# Patient Record
Sex: FEMALE | Race: WHITE | Employment: FULL TIME | URBAN - METROPOLITAN AREA
[De-identification: names, ages, dates, MRNs, and addresses within clinical notes are randomized per-mention and may not be internally consistent; named-entity substitution may affect disease eponyms.]

---

## 2018-11-14 ENCOUNTER — OFFICE VISIT (OUTPATIENT)
Dept: OBGYN | Age: 38
End: 2018-11-14
Payer: COMMERCIAL

## 2018-11-14 ENCOUNTER — HOSPITAL ENCOUNTER (OUTPATIENT)
Age: 38
Setting detail: SPECIMEN
Discharge: HOME | End: 2018-11-14
Payer: COMMERCIAL

## 2018-11-14 VITALS
DIASTOLIC BLOOD PRESSURE: 80 MMHG | SYSTOLIC BLOOD PRESSURE: 136 MMHG | HEIGHT: 65 IN | WEIGHT: 204 LBS | BODY MASS INDEX: 33.99 KG/M2

## 2018-11-14 DIAGNOSIS — N93.9 ABNORMAL UTERINE BLEEDING: ICD-10-CM

## 2018-11-14 DIAGNOSIS — Z01.419 WELL WOMAN EXAM WITH ROUTINE GYNECOLOGICAL EXAM: Primary | ICD-10-CM

## 2018-11-14 DIAGNOSIS — Z11.3 SCREEN FOR STD (SEXUALLY TRANSMITTED DISEASE): ICD-10-CM

## 2018-11-14 DIAGNOSIS — Z12.39 SCREENING FOR BREAST CANCER: ICD-10-CM

## 2018-11-14 DIAGNOSIS — Z01.419 WELL WOMAN EXAM WITH ROUTINE GYNECOLOGICAL EXAM: ICD-10-CM

## 2018-11-14 PROCEDURE — 58100 BIOPSY OF UTERUS LINING: CPT | Performed by: OBSTETRICS & GYNECOLOGY

## 2018-11-14 PROCEDURE — 87591 N.GONORRHOEAE DNA AMP PROB: CPT

## 2018-11-14 PROCEDURE — 88305 TISSUE EXAM BY PATHOLOGIST: CPT

## 2018-11-14 PROCEDURE — G0145 SCR C/V CYTO,THINLAYER,RESCR: HCPCS

## 2018-11-14 PROCEDURE — 87491 CHLMYD TRACH DNA AMP PROBE: CPT

## 2018-11-14 PROCEDURE — 99396 PREV VISIT EST AGE 40-64: CPT | Performed by: OBSTETRICS & GYNECOLOGY

## 2018-11-14 ASSESSMENT — PATIENT HEALTH QUESTIONNAIRE - PHQ9
SUM OF ALL RESPONSES TO PHQ9 QUESTIONS 1 & 2: 0
1. LITTLE INTEREST OR PLEASURE IN DOING THINGS: 0
SUM OF ALL RESPONSES TO PHQ QUESTIONS 1-9: 0
2. FEELING DOWN, DEPRESSED OR HOPELESS: 0
SUM OF ALL RESPONSES TO PHQ QUESTIONS 1-9: 0

## 2018-11-14 NOTE — PROGRESS NOTES
hernia          PE:  Vital Signs  Blood pressure 136/80, height 5' 5\" (1.651 m), weight 204 lb (92.5 kg), last menstrual period 10/26/2018. Labs:    No results found for this visit on 11/14/18. NURSE: COOPER    HPI: The patient is here today with complaints of very heavy menstrual bleeding. She describes last month passing a large amount of tissue in addition to heavy blood clots associated with severe dysmenorrhea. She states she has not been able to get pregnant since her delivery in 2007 is using nothing for birth control. She also describes a sharp or pinching pain in the lower pelvis times    The patient describes her delivery as being very difficult and having a posterior tear as well as developing severe incontinence required a bladder neck suspension    Review of systems: No PT denies fever, chills, nausea and vomiting         Objective  Lymphatic:   no lymphadenopathy  Heent:   negative   Cor: regular rate and rhythm, no murmurs              Pul:clear to auscultation bilaterally- no wheezes, rales or rhonchi, normal air movement, no respiratory distress      GI: Abdomen soft, non-tender. BS normal. No masses,  No organomegaly           Extremities: normal strength, tone, and muscle mass   Breasts: Breast:normal appearance, no masses or tenderness, Inspection negative, No nipple retraction or dimpling, No nipple discharge or bleeding, No axillary or supraclavicular adenopathy, Normal to palpation without dominant masses   Pelvic Exam: GENITAL/URINARY:  External Genitalia:  General appearance; normal, Hair distribution; normal, Lesions absent  Cervix:  General appearance normal, Lesions absent, Discharge absent, Tenderness absent, Enlargement absent, Nodularity absent, there does appear to be some scarring of the cervix  Uterus:  Size normal, Contour normal, Position normal, Masses absent, Consistency; normal, Support normal, Tenderness absent  Adenexa:   Masses absent, Tenderness absent, Enlargement

## 2018-11-16 LAB
CHLAMYDIA BY THIN PREP: NEGATIVE
CYTOLOGY REPORT: NORMAL
N. GONORRHOEAE DNA, THIN PREP: NEGATIVE
SURGICAL PATHOLOGY REPORT: NORMAL

## 2018-11-29 ENCOUNTER — OFFICE VISIT (OUTPATIENT)
Dept: OBGYN | Age: 38
End: 2018-11-29
Payer: COMMERCIAL

## 2018-11-29 VITALS
WEIGHT: 203 LBS | BODY MASS INDEX: 33.82 KG/M2 | HEIGHT: 65 IN | SYSTOLIC BLOOD PRESSURE: 124 MMHG | DIASTOLIC BLOOD PRESSURE: 74 MMHG

## 2018-11-29 DIAGNOSIS — N93.9 ABNORMAL UTERINE BLEEDING: Primary | ICD-10-CM

## 2018-11-29 PROCEDURE — 76856 US EXAM PELVIC COMPLETE: CPT | Performed by: OBSTETRICS & GYNECOLOGY

## 2018-11-29 PROCEDURE — 99213 OFFICE O/P EST LOW 20 MIN: CPT | Performed by: OBSTETRICS & GYNECOLOGY

## 2018-11-29 RX ORDER — ETHYNODIOL DIACETATE AND ETHINYL ESTRADIOL 1 MG-35MCG
1 KIT ORAL DAILY
Qty: 1 PACKET | Refills: 12 | Status: SHIPPED | OUTPATIENT
Start: 2018-11-29 | End: 2019-11-25 | Stop reason: SDUPTHER

## 2018-11-29 NOTE — LETTER
Dillan Manzo  [address]  Phone: [phone]  Fax: [phone]    Trice Vizcarra MD        November 29, 2018     Patient: Chapincito Last   YOB: 1980   Date of Visit: 11/29/2018       To Whom it May Concern:    Ace Anglin was seen in my clinic on 11/29/2018. She may return to work on 11/30/2018. If you have any questions or concerns, please don't hesitate to call.     Sincerely,         Trice Vizcarra MD

## 2018-11-29 NOTE — PROGRESS NOTES
complete transvaginal non OB    ethynodiol-ethinyl estradiol (ZOVIA 1/35E, 28,) 1-35 MG-MCG per tablet             No Follow-up on file. FF: 15 minutes    There are no Patient Instructions on file for this visit. Over 75%of time spent on counseling and care coordination on: see assessment and plan,  She was also counseled on her preventative health maintenance recommendations and follow-up.       Robbi Rubio,11/29/2018 5:23 PM

## 2019-11-25 ENCOUNTER — OFFICE VISIT (OUTPATIENT)
Dept: OBGYN | Age: 39
End: 2019-11-25
Payer: COMMERCIAL

## 2019-11-25 ENCOUNTER — HOSPITAL ENCOUNTER (OUTPATIENT)
Age: 39
Setting detail: SPECIMEN
Discharge: HOME | End: 2019-11-25
Payer: COMMERCIAL

## 2019-11-25 VITALS
DIASTOLIC BLOOD PRESSURE: 68 MMHG | WEIGHT: 178 LBS | SYSTOLIC BLOOD PRESSURE: 116 MMHG | BODY MASS INDEX: 30.39 KG/M2 | HEIGHT: 64 IN

## 2019-11-25 DIAGNOSIS — Z12.39 SCREENING FOR BREAST CANCER: ICD-10-CM

## 2019-11-25 DIAGNOSIS — Z01.419 WOMEN'S ANNUAL ROUTINE GYNECOLOGICAL EXAMINATION: ICD-10-CM

## 2019-11-25 DIAGNOSIS — N93.9 ABNORMAL UTERINE BLEEDING: ICD-10-CM

## 2019-11-25 DIAGNOSIS — Z01.419 WOMEN'S ANNUAL ROUTINE GYNECOLOGICAL EXAMINATION: Primary | ICD-10-CM

## 2019-11-25 PROCEDURE — 99396 PREV VISIT EST AGE 40-64: CPT | Performed by: OBSTETRICS & GYNECOLOGY

## 2019-11-25 PROCEDURE — G0145 SCR C/V CYTO,THINLAYER,RESCR: HCPCS

## 2019-11-25 RX ORDER — ETHYNODIOL DIACETATE AND ETHINYL ESTRADIOL 1 MG-35MCG
1 KIT ORAL DAILY
Qty: 1 PACKET | Refills: 12 | Status: SHIPPED | OUTPATIENT
Start: 2019-11-25 | End: 2020-01-15

## 2019-11-27 ENCOUNTER — HOSPITAL ENCOUNTER (OUTPATIENT)
Dept: WOMENS IMAGING | Age: 39
Discharge: HOME | End: 2019-11-29
Payer: COMMERCIAL

## 2019-11-27 DIAGNOSIS — Z12.39 SCREENING FOR BREAST CANCER: ICD-10-CM

## 2019-11-27 PROCEDURE — 77063 BREAST TOMOSYNTHESIS BI: CPT

## 2019-11-29 DIAGNOSIS — R92.8 ABNORMALITY OF RIGHT BREAST ON SCREENING MAMMOGRAM: Primary | ICD-10-CM

## 2019-12-03 LAB — CYTOLOGY REPORT: NORMAL

## 2019-12-18 ENCOUNTER — HOSPITAL ENCOUNTER (OUTPATIENT)
Dept: ULTRASOUND IMAGING | Age: 39
Discharge: HOME | End: 2019-12-20
Payer: COMMERCIAL

## 2019-12-18 ENCOUNTER — HOSPITAL ENCOUNTER (OUTPATIENT)
Dept: WOMENS IMAGING | Age: 39
Discharge: HOME | End: 2019-12-20
Payer: COMMERCIAL

## 2019-12-18 DIAGNOSIS — R92.8 ABNORMAL MAMMOGRAM OF RIGHT BREAST: Primary | ICD-10-CM

## 2019-12-18 DIAGNOSIS — R92.8 ABNORMALITY OF RIGHT BREAST ON SCREENING MAMMOGRAM: ICD-10-CM

## 2019-12-18 PROCEDURE — 76641 ULTRASOUND BREAST COMPLETE: CPT

## 2019-12-18 PROCEDURE — G0279 TOMOSYNTHESIS, MAMMO: HCPCS

## 2019-12-20 ENCOUNTER — HOSPITAL ENCOUNTER (OUTPATIENT)
Dept: ULTRASOUND IMAGING | Age: 39
Discharge: HOME | End: 2019-12-22
Payer: COMMERCIAL

## 2019-12-20 ENCOUNTER — HOSPITAL ENCOUNTER (OUTPATIENT)
Dept: WOMENS IMAGING | Age: 39
Discharge: HOME | End: 2019-12-22
Payer: COMMERCIAL

## 2019-12-20 VITALS
DIASTOLIC BLOOD PRESSURE: 91 MMHG | HEART RATE: 79 BPM | OXYGEN SATURATION: 100 % | SYSTOLIC BLOOD PRESSURE: 120 MMHG | RESPIRATION RATE: 12 BRPM

## 2019-12-20 DIAGNOSIS — R92.8 ABNORMAL MAMMOGRAM OF RIGHT BREAST: ICD-10-CM

## 2019-12-20 DIAGNOSIS — N63.10 BREAST MASS, RIGHT: ICD-10-CM

## 2019-12-20 PROCEDURE — 2709999900 US BREAST BIOPSY W LOC DEVICE 1ST LESION RIGHT

## 2019-12-20 PROCEDURE — 88360 TUMOR IMMUNOHISTOCHEM/MANUAL: CPT

## 2019-12-20 PROCEDURE — 2500000003 HC RX 250 WO HCPCS: Performed by: RADIOLOGY

## 2019-12-20 PROCEDURE — 77065 DX MAMMO INCL CAD UNI: CPT

## 2019-12-20 PROCEDURE — 88305 TISSUE EXAM BY PATHOLOGIST: CPT

## 2019-12-20 PROCEDURE — 88342 IMHCHEM/IMCYTCHM 1ST ANTB: CPT

## 2019-12-20 RX ORDER — LIDOCAINE HYDROCHLORIDE 10 MG/ML
INJECTION, SOLUTION EPIDURAL; INFILTRATION; INTRACAUDAL; PERINEURAL
Status: COMPLETED | OUTPATIENT
Start: 2019-12-20 | End: 2019-12-20

## 2019-12-20 RX ADMIN — LIDOCAINE HYDROCHLORIDE 20 ML: 10 INJECTION, SOLUTION EPIDURAL; INFILTRATION; INTRACAUDAL; PERINEURAL at 13:28

## 2019-12-24 LAB — SURGICAL PATHOLOGY REPORT: NORMAL

## 2020-01-03 ENCOUNTER — OFFICE VISIT (OUTPATIENT)
Dept: OBGYN | Age: 40
End: 2020-01-03
Payer: COMMERCIAL

## 2020-01-03 VITALS
WEIGHT: 175 LBS | SYSTOLIC BLOOD PRESSURE: 122 MMHG | DIASTOLIC BLOOD PRESSURE: 78 MMHG | HEIGHT: 64 IN | BODY MASS INDEX: 29.88 KG/M2

## 2020-01-03 PROCEDURE — 99213 OFFICE O/P EST LOW 20 MIN: CPT | Performed by: OBSTETRICS & GYNECOLOGY

## 2020-01-03 ASSESSMENT — PATIENT HEALTH QUESTIONNAIRE - PHQ9: DEPRESSION UNABLE TO ASSESS: PT REFUSES

## 2020-01-06 ENCOUNTER — OFFICE VISIT (OUTPATIENT)
Dept: SURGERY | Age: 40
End: 2020-01-06
Payer: COMMERCIAL

## 2020-01-06 VITALS — WEIGHT: 179 LBS | RESPIRATION RATE: 18 BRPM | HEIGHT: 64 IN | BODY MASS INDEX: 30.56 KG/M2

## 2020-01-06 PROCEDURE — 99202 OFFICE O/P NEW SF 15 MIN: CPT | Performed by: SURGERY

## 2020-01-13 ENCOUNTER — INITIAL CONSULT (OUTPATIENT)
Dept: ONCOLOGY | Age: 40
End: 2020-01-13
Payer: COMMERCIAL

## 2020-01-13 PROCEDURE — 96040 PR GENETIC COUNSELING, EACH 30 MIN: CPT | Performed by: GENETIC COUNSELOR, MS

## 2020-01-13 NOTE — PROGRESS NOTES
3 [facility]   Hereditary Cancer Risk Assessment     Name: Zhao Malagon   YOB: 1980   Date of Consultation: 1/13/20     Ms. Ulises Villalobos was seen at the [facility] for genetic counseling on 1/13/20. Ms. Ulises Villalobos was referred by Dr. Ann Bradshaw due to her personal and family history of breast cancer. Ms. Isidro Cook significant other was also present at the appointment. PERSONAL HISTORY   Ms. Ulises Villalobos is a 37 y.o.  female who was recently diagnosed with breast cancer at age 37. 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 She has met with a surgeon and is currently planning for right mastectomy. She states that she is not interested in waiting for genetic test results prior to her surgery. She is aware that if her genetic test results were to show an increased risk for a second primary breast cancer, that preventive mastectomy would be a recommendation. FAMILY HISTORY  Ms. Ulises Villalobos has one son (12y). She is an only child and does not have any biological siblings. Ms. Isidro Cook mother is living at age 61. She has a prior history of cervical cancer which required a total hysterectomy in her 35s. She has not developed cancer since then. Ms. Ulises Vilallobos has at least 4 maternal aunts and 2 maternal uncles. One aunt may have had breast cancer in her 46s and another aunt may have had cervical cancer in her 45s. One uncle possibly had colon cancer. It is known that her maternal grandmother was diagnosed with breast cancer in her 45s and passed away from the disease at age 46. There is very limited medical history information regarding Ms. Be's father and his relatives. One aunt may have had breast cancer in her 35s and a paternal grandmother may have had stomach and breast cancer. Ms. Ulises Villalobos reports unknown ancestry and denies any known Ashkenazi Rastafari heritage. RISK ASSESSMENT   We discussed that approximately 5-10% of cancers are due to a hereditary gene mutation which causes an increased risk for certain cancers. Hereditary cancers are typically diagnosed at younger ages (under age 46y) and occur in multiple generations of a family. Multiple individuals with the same type of cancer (example: breast) or uncommon cancers (example: ovarian, pancreatic, male breast cancer) are also features of hereditary cancers. We discussed that Ms. Be's personal history is somewhat concerning for a hereditary predisposition to cancer given that her breast cancer was diagnosed under age 46 and that she has several another close maternal and paternal relatives with breast cancer. In summary, Ms. Haro [facility] (NCCN) guidelines for genetic testing based on her personal history of breast cancer diagnosed under age 37. DISCUSSION  We discussed that the BRCA1/2 genes are the most common genes associated with hereditary breast and ovarian cancer. We also discussed that genetic testing is available for multiple other genes related to hereditary cancer. Some of these genes are known to carry a significant increased risk for several cancers including colon, breast, uterine, ovarian, stomach, and pancreatic cancer, while some of these genes are believed to have a moderate increased risk for breast and other cancers. We discussed the possibility of finding a mutation in genes with limited information to guide medical management, as well we as the possibility of identifying variants of uncertain significance (VUS). We discussed the risks, benefits, and limitations of genetic testing. Possible test results were discussed as well as potential screening and prevention strategies. Specifically, we discussed increased breast cancer surveillance by mammogram and breast MRI as well as the option of prophylactic mastectomy.  We discussed the

## 2020-01-15 ENCOUNTER — OFFICE VISIT (OUTPATIENT)
Dept: ONCOLOGY | Age: 40
End: 2020-01-15
Payer: COMMERCIAL

## 2020-01-15 ENCOUNTER — TELEPHONE (OUTPATIENT)
Dept: ONCOLOGY | Age: 40
End: 2020-01-15

## 2020-01-15 VITALS
HEIGHT: 64 IN | TEMPERATURE: 98.5 F | HEART RATE: 80 BPM | BODY MASS INDEX: 30.37 KG/M2 | RESPIRATION RATE: 18 BRPM | WEIGHT: 177.9 LBS | DIASTOLIC BLOOD PRESSURE: 69 MMHG | SYSTOLIC BLOOD PRESSURE: 111 MMHG

## 2020-01-15 PROCEDURE — 99245 OFF/OP CONSLTJ NEW/EST HI 55: CPT | Performed by: INTERNAL MEDICINE

## 2020-01-15 RX ORDER — TRANEXAMIC ACID 650 1/1
TABLET ORAL
Qty: 30 TABLET | Refills: 3 | Status: SHIPPED | OUTPATIENT
Start: 2020-01-15 | End: 2020-03-17

## 2020-01-15 RX ORDER — LORAZEPAM 1 MG/1
1 TABLET ORAL EVERY 6 HOURS PRN
Qty: 20 TABLET | Refills: 0 | Status: SHIPPED | OUTPATIENT
Start: 2020-01-15 | End: 2020-02-14

## 2020-01-15 RX ORDER — ONDANSETRON 4 MG/1
4 TABLET, FILM COATED ORAL EVERY 6 HOURS PRN
Qty: 30 TABLET | Refills: 0 | Status: SHIPPED | OUTPATIENT
Start: 2020-01-15 | End: 2020-02-14

## 2020-01-15 NOTE — PROGRESS NOTES
Dr Annabel Vang spoke to patient about stopping current birth control and to call Dr Suzie Stuart about other options. Patient verbalized understanding.

## 2020-01-15 NOTE — PROGRESS NOTES
cancer and paternal grandmother had ?stomach cancer. SOCIAL HISTORY:  reports that she has never smoked. She has never used smokeless tobacco. She reports current alcohol use. She reports that she does not use drugs. REVIEW OF SYSTEMS:   General: no fever or night sweats, Weight is stable. ENT: No double or blurred vision, no tinnitus or hearing problem, no dysphagia or sore throat   Respiratory: No chest pain, no shortness of breath, no cough or hemoptysis. Cardiovascular: Denies chest pain, PND or orthopnea. No L E swelling or palpitations. Gastrointestinal:    No nausea or vomiting, abdominal pain, diarrhea or constipation. Genitourinary: Denies dysuria, hematuria, frequency, urgency or incontinence. Neurological: Denies headaches, decreased LOC, no sensory or motor focal deficits. Musculoskeletal:  No arthralgia no back pain or joint swelling. Skin: There are no rashes or bleeding. Psychiatric:  No anxiety, no depression. Endocrine: no diabetes or thyroid disease. Hematologic: no bleeding , no adenopathy. PHYSICAL EXAM: Shows a well appearing 37y.o.-year-old female who is not in pain or distress. Vital Signs: Blood pressure 111/69, pulse 80, temperature 98.5 °F (36.9 °C), temperature source Temporal, resp. rate 18, height 5' 4\" (1.626 m), weight 177 lb 14.4 oz (80.7 kg), last menstrual period 12/23/2019. HEENT: Normocephalic and atraumatic. Pupils are equal, round, reactive to light and accommodation. Extraocular muscles are intact. Neck: Showed no JVD, no carotid bruit . Lungs: Clear to auscultation bilaterally. Heart: Regular without any murmur. Abdomen: Soft, nontender. No hepatosplenomegaly. Extremities: Lower extremities show no edema, clubbing, or cyanosis. Breasts: Examination showed post biopsy changes in the inferior aspect of the right breast.  Both breasts are very dense and lumpy. No lymphadenopathy appreciated. . Neuro exam: intact cranial nerves bilaterally no motor or about the risk of breast cancer she has BRCA positive and the risk reduction by surgery, or by antiestrogens. We also talked about reconstruction, if she chooses mastectomy she will definitely want reconstruction either by an implant or by D IEP flap. We talked also about radiation and the schedule. She also talked about her son who is 15years old and  his risk of developing any cancer. With advised her to await the genetic testing. The patient verbalized understanding and agreement. We will await the genetic testing but she is scheduled to have surgery in the near future. We will see her after surgery with her at lumpectomy or mastectomy, we will talk to her about adjuvant radiation if needed and adjuvant tamoxifen. Very concerned about her combination hormone therapy especially estrogen containing. We asked her to stop them but she is very nervous about recurrence of her menorrhagia. We will call OB and try to find an alternative way to control her menorrhagia. She is also very nervous. I would write for some Zofran but I think some Ativan will help with her anxiety.

## 2020-02-03 ENCOUNTER — TELEPHONE (OUTPATIENT)
Dept: ONCOLOGY | Age: 40
End: 2020-02-03

## 2020-02-05 ENCOUNTER — OFFICE VISIT (OUTPATIENT)
Dept: ONCOLOGY | Age: 40
End: 2020-02-05
Payer: COMMERCIAL

## 2020-02-05 VITALS
RESPIRATION RATE: 18 BRPM | WEIGHT: 177 LBS | HEART RATE: 74 BPM | BODY MASS INDEX: 30.22 KG/M2 | TEMPERATURE: 98.7 F | HEIGHT: 64 IN | DIASTOLIC BLOOD PRESSURE: 89 MMHG | SYSTOLIC BLOOD PRESSURE: 137 MMHG

## 2020-02-05 PROCEDURE — 99214 OFFICE O/P EST MOD 30 MIN: CPT | Performed by: INTERNAL MEDICINE

## 2020-02-05 PROCEDURE — 96040 PR GENETIC COUNSELING, EACH 30 MIN: CPT | Performed by: GENETIC COUNSELOR, MS

## 2020-02-05 RX ORDER — OMEPRAZOLE 20 MG/1
20 CAPSULE, DELAYED RELEASE ORAL DAILY
Qty: 30 CAPSULE | Refills: 3 | Status: SHIPPED | OUTPATIENT
Start: 2020-02-05

## 2020-02-05 RX ORDER — PROCHLORPERAZINE MALEATE 10 MG
10 TABLET ORAL EVERY 6 HOURS PRN
Qty: 80 TABLET | Refills: 0 | Status: ON HOLD
Start: 2020-02-05 | End: 2020-04-04 | Stop reason: HOSPADM

## 2020-02-05 NOTE — PROGRESS NOTES
Facial cosmetic surgery; and Hand tendon surgery. CURRENT MEDICATIONS:  has a current medication list which includes the following prescription(s): omeprazole, prochlorperazine, and tranexamic acid. ALLERGIES:  is allergic to other. FAMILY HISTORY: very strong family hx of breast cancer and other tumors. Mother has cervical cancer. Maternal aunt had breast cancer in her thirties, maternal uncle had colon cancer, maternal grandmother had breast cancer, and great grandmother  Also had breast caner. On the father side, paternal aunt had breast cancer and paternal grandmother had ?stomach cancer. SOCIAL HISTORY:  reports that she has never smoked. She has never used smokeless tobacco. She reports current alcohol use. She reports that she does not use drugs. REVIEW OF SYSTEMS:   General: no fever or night sweats, Weight is stable. ENT: No double or blurred vision, no tinnitus or hearing problem, no dysphagia or sore throat   Respiratory: No chest pain, no shortness of breath, no cough or hemoptysis. Cardiovascular: Denies chest pain, PND or orthopnea. No L E swelling or palpitations. Gastrointestinal:    No nausea or vomiting, abdominal pain, diarrhea or constipation. Genitourinary: Denies dysuria, hematuria, frequency, urgency or incontinence. Neurological: Denies headaches, decreased LOC, no sensory or motor focal deficits. Musculoskeletal:  No arthralgia no back pain or joint swelling. Skin: There are no rashes or bleeding. Psychiatric:  No anxiety, no depression. Endocrine: no diabetes or thyroid disease. Hematologic: no bleeding , no adenopathy. PHYSICAL EXAM: Shows a well appearing 37y.o.-year-old female who is not in pain or distress. Vital Signs: Blood pressure 137/89, pulse 74, temperature 98.7 °F (37.1 °C), temperature source Temporal, resp. rate 18, height 5' 4\" (1.626 m), weight 177 lb (80.3 kg). HEENT: Normocephalic and atraumatic.  Pupils are equal, round, reactive to light and accommodation. Extraocular muscles are intact. Neck: Showed no JVD, no carotid bruit . Lungs: Clear to auscultation bilaterally. Heart: Regular without any murmur. Abdomen: Soft, nontender. No hepatosplenomegaly. Extremities: Lower extremities show no edema, clubbing, or cyanosis. Breasts: Examination showed post biopsy changes in the inferior aspect of the right breast.  Both breasts are very dense and lumpy. No lymphadenopathy appreciated. . Neuro exam: intact cranial nerves bilaterally no motor or sensory deficit, gait is normal. Lymphatic: no adenopathy appreciated in the supraclavicular, axillary, cervical or inguinal area    REVIEW OF LABORATORY DATA:   Pathology 12/20/2019  -- Diagnosis --   RIGHT BREAST, 5-6:00 (APPROXIMATELY 4 CM FROM THE NIPPLE), NEEDLE CORE   BIOPSIES:   - DUCTAL CARCINOMA IN SITU (DCIS), MICROPAPILLARY/CRIBRIFORM TYPE,    INTERMEDIATE GRADE, WITH COMEDONECROSIS AND FOCAL    MICROCALCIFICATIONS, ER POSITIVE, OK POSITIVE. REVIEW OF RADIOLOGICAL RESULTS:   Impression   1.  Grouping of calcifications lower inner right breast anteriorly at 5-6   o'clock with associated mass are pleomorphic.  Tissue sampling under   ultrasonographic guidance is advised as the calcifications are associated   with a mass which is detectable using ultrasound.       2.  Widely distributed calcifications in the right breast.  Advise MRI   imaging of the breast which can be performed following the biopsy of the   right breast under ultrasound guidance.       BIRADS:   BIRADS - CATEGORY 4C       Moderate suspicion for malignancy. IMPRESSION:   1. Right-sided ductal carcinoma in situ, inner lower quadrant, intermediate grade, ER OK positive  2. CHECK 2 gene mutation. 3. Very strong family history of breast cancer at early age  3. Menorrhagia    PLAN:   I had a very long discussion with the patient. I explained her imaging studies, pathology and clinical features.   We explained the nature of DCIS.  Patient has highly curable condition. DCIS is stage 0 breast cancer which has a 99% cure rate with surgery,and antiestrogens. Her DCIS is ER FL positive so she would benefit from tamoxifen. I explained the results of the genetic testing. She has CHECK 2 gene mutation. Explained risk for different malignancies. Patient decided to do bilateral mastectomy and reconstruction. We will make referral to surgery. She also decided to do oophorectomy in a later time. We  Recommend colonoscopy every 5 years. Patient will be seen after surgery. Patient's questions were answered to the best of her satisfaction and she verbalized full understanding and agreement.                                 [address] [facility], operated by [facility]                          Cell: [phone]

## 2020-02-05 NOTE — PROGRESS NOTES
3 [facility]   Hereditary Cancer Risk Assessment      Name: Wero Valenzuela  YOB: 1980  Date of Results Disclosure: 2/5/20     HISTORY   Ms. Celina Franco was seen for genetic counseling on 1/13/20 at the request of Dr. Dwight Dakins due to her personal and family history of breast cancer. At that time, Ms. Celina Franco chose to pursue genetic testing via the CancerNext Expanded + RNA. These results were discussed with Ms. Celina Franco on 2/5/20. A summary of Ms. Be's results and recommendations are below. RESULTS  [facility] CancerNext-Expanded Panel + RNAinsight:   POSITIVE - PATHOGENIC MUTATION DETECTED IN BRCA2 ([id number])  POSITIVE - MODERATE RISK MUTATION DETECTED IN CHEK2 (p.I157T)  This panel included the analysis of 67 genes associated with hereditary cancer including: AIP, ALK, APC, NATALIA, BAP1, BARD1, BLM, BMPR1A, BRCA1, BRCA2, BRIP1, CDH1, CDK4, CDKN1B, CDKN2A, CHEK2, DICER1, EPCAM, FANCC, FH, FLCN, GALNT12, GREM1, HOXB13, MAX, MEN1, MET, MITF, MLH1, MRE11A, MSH2, MSH6, MUTYH, NBN, NF1, NF2, PALB2, PHOX2B, PMS2, POLD1, POLE, POT1, ZQBHM5J, PTCH1, PTEN, RAD50, RAD51C, RAD51D, RB1, RET, SDHA, SDHAF2, SDHB, SDHC, ,SDHD, SMAD4, SMARCA4, SMARCB1, SMARCE1, STK11, SUFU, JMRY353, TP53, TSC1, TSC2, VHL, and XRCC2. In addition, no clinically relevant aberrant RNA transcripts were detected in select genes. Please refer to genetic test report for technical details. 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 Because the significance of the PMS2 gene VUS is unknown, medical management must be based on Ms. Be's personal history and family history of cancer.       Ms. Patel Pickup results identified two pathogenic gene mutations which 1.2020 guidelines for cancer screening and prevention as outlined below. FEMALE BREAST CANCER      NCCN Recommendation Age to Begin Frequency    Breast awareness - Women should be familiar with their breasts and promptly report changes to their healthcare provider. Periodic, consistent breast self-examination (BSE) may be beneficial  Individualized  N/A    Clinical Breast Examination  20years old  Every 6-12 months   Breast MRI with contrast  20years old  Annual   Mammogram (consider tomosynthesis)  25years old  Annual    Consider risk reducing mastectomy  Individualized  N/A   Consider risk reducing agents, such as chemoprevention (i.e. Tamoxifen)  Individualized  N/A      OVARIAN CANCER      NCCN Recommendation Age to Begin Frequency   Bilateral salpingo-oophorectomy   (removal of ovaries and fallopian tubes)  35-40 years  N/A   Consider risk reducing agents (i.e. oral contraceptives)  Individualized  N/A      If risk reducing surgery is not performed, ovarian cancer screening by transvaginal ultrasound and/or serum CA-125 may be considered at the clinician's discretion beginning at age 32-31. Although, there is no proven benefit to ovarian cancer screening at this time. Given that the age of onset for ovarian cancer in BRCA2 carriers is 8-10 years later than BRCA1 carriers, it maybe reasonable to delay risk reducing BSO until age 39-44, unless age of diagnosis warrants earlier consideration of risk reducing surgery. PROSTATE      NCCN Recommendation Age to Begin  Frequency   Prostate screening including PSA and digital rectal exam (HALEIGH) 45 years  1-4 years   based on results        MALE BREAST      NCCN Recommendation Age to Begin  Frequency    Breast self-examination (BSE) may be beneficial  35 years Periodic and consistent   Clinical Breast Exam 35 years  Yearly       PANCREATIC  There are currently limited screening guidelines for pancreatic cancer.  Screening may be individualized for those breast cancer is increased. Therefore, we recommend that she consider the NCCN screening and risk reducing options outlined above. She meets with Dr. Stalin Layne today to discuss recommendations for risk reducing mastectomy and other risk reducing options. Her surgeon, Dr. Victor Manuel Haywood, has also been made aware of her results. 3) Based on Ms. Be's test result, her lifetime risk to develop ovarian cancer is increased. We recommend that [facility] undergo risk reducing bilateral salpingo oophorectomy. She should also continue gynecologic cancer screening as directed by her physicians. 4) Based on Ms. Be's test result, her lifetime risk for pancreatic cancer may be increased; however, the exact risk is unclear at this time. There are currently no consensus screening guidelines for pancreatic cancer. She may benefit from a referral to a pancreatic cancer specialist to discuss options for pancreatic cancer screening. 5) Based on Ms. Be's test result, her lifetime risk for colorectal cancer is increased. We recommend that she consider colonoscopy screening every 5 years beginning at age 36 according to the NCCN guidelines outlined above. 6) We encourage [facility]. [facility] to share her genetic test results with her family members. Support and resources were given to [facility]. [facility], including [facility]. She is encouraged to contact her genetic counselor for any additional questions or support. 7) We will contact [facility]. 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 8) We encourage [facility]. [facility] to contact us with updates to her personal and/or family's cancer history as this information may alter our assessment and/or recommendations. The [address] [place] [address] [facility] [facility] would be glad to offer our assistance should you have any questions or concerns about this information.  Please feel free to

## 2020-02-11 ENCOUNTER — TELEPHONE (OUTPATIENT)
Dept: SURGERY | Age: 40
End: 2020-02-11

## 2020-02-11 NOTE — TELEPHONE ENCOUNTER
Right breast lumpectomy scheduled with Dr Helen Quiros on 02/12/20 has been canceled. Following genetic testing, Dr Kishore Escalera referred patient to Samantha Jones MD at [facility] for mastectomy and reconstruction.

## 2020-02-14 ENCOUNTER — HOSPITAL ENCOUNTER (OUTPATIENT)
Dept: GENERAL RADIOLOGY | Age: 40
Discharge: HOME | End: 2020-02-16
Payer: COMMERCIAL

## 2020-02-14 ENCOUNTER — HOSPITAL ENCOUNTER (OUTPATIENT)
Age: 40
Discharge: HOME | End: 2020-02-16
Payer: COMMERCIAL

## 2020-02-14 ENCOUNTER — HOSPITAL ENCOUNTER (OUTPATIENT)
Age: 40
Setting detail: SPECIMEN
Discharge: HOME | End: 2020-02-14
Payer: COMMERCIAL

## 2020-02-14 LAB
ABSOLUTE EOS #: 0.11 K/UL (ref 0–0.44)
ABSOLUTE IMMATURE GRANULOCYTE: 0.04 K/UL (ref 0–0.3)
ABSOLUTE LYMPH #: 2.79 K/UL (ref 1.1–3.7)
ABSOLUTE MONO #: 0.63 K/UL (ref 0.1–1.2)
ALBUMIN SERPL-MCNC: 4.2 G/DL (ref 3.5–5.2)
ALBUMIN/GLOBULIN RATIO: 1.4 (ref 1–2.5)
ALP BLD-CCNC: 74 U/L (ref 35–104)
ALT SERPL-CCNC: 52 U/L (ref 5–33)
ANION GAP SERPL CALCULATED.3IONS-SCNC: 15 MMOL/L (ref 9–17)
AST SERPL-CCNC: 33 U/L
BASOPHILS # BLD: 1 % (ref 0–2)
BASOPHILS ABSOLUTE: 0.1 K/UL (ref 0–0.2)
BILIRUB SERPL-MCNC: <0.1 MG/DL (ref 0.3–1.2)
BUN BLDV-MCNC: 10 MG/DL (ref 6–20)
BUN/CREAT BLD: ABNORMAL (ref 9–20)
CALCIUM SERPL-MCNC: 9.5 MG/DL (ref 8.6–10.4)
CHLORIDE BLD-SCNC: 103 MMOL/L (ref 98–107)
CO2: 24 MMOL/L (ref 20–31)
CREAT SERPL-MCNC: 0.78 MG/DL (ref 0.5–0.9)
DIFFERENTIAL TYPE: NORMAL
EOSINOPHILS RELATIVE PERCENT: 1 % (ref 1–4)
ESTRADIOL LEVEL: 6 PG/ML (ref 27–314)
GFR AFRICAN AMERICAN: >60 ML/MIN
GFR NON-AFRICAN AMERICAN: >60 ML/MIN
GFR SERPL CREATININE-BSD FRML MDRD: ABNORMAL ML/MIN/{1.73_M2}
GFR SERPL CREATININE-BSD FRML MDRD: ABNORMAL ML/MIN/{1.73_M2}
GLUCOSE BLD-MCNC: 97 MG/DL (ref 70–99)
HCT VFR BLD CALC: 46.7 % (ref 36.3–47.1)
HEMOGLOBIN: 14.4 G/DL (ref 11.9–15.1)
IMMATURE GRANULOCYTES: 0 %
LYMPHOCYTES # BLD: 30 % (ref 24–43)
MCH RBC QN AUTO: 28.2 PG (ref 25.2–33.5)
MCHC RBC AUTO-ENTMCNC: 30.8 G/DL (ref 28.4–34.8)
MCV RBC AUTO: 91.4 FL (ref 82.6–102.9)
MONOCYTES # BLD: 7 % (ref 3–12)
NRBC AUTOMATED: 0 PER 100 WBC
PDW BLD-RTO: 13.2 % (ref 11.8–14.4)
PLATELET # BLD: 302 K/UL (ref 138–453)
PLATELET ESTIMATE: NORMAL
PMV BLD AUTO: 10.4 FL (ref 8.1–13.5)
POTASSIUM SERPL-SCNC: 4 MMOL/L (ref 3.7–5.3)
PROGESTERONE LEVEL: 0.16 NG/ML
RBC # BLD: 5.11 M/UL (ref 3.95–5.11)
RBC # BLD: NORMAL 10*6/UL
SEG NEUTROPHILS: 61 % (ref 36–65)
SEGMENTED NEUTROPHILS ABSOLUTE COUNT: 5.76 K/UL (ref 1.5–8.1)
SODIUM BLD-SCNC: 142 MMOL/L (ref 135–144)
TOTAL PROTEIN: 7.2 G/DL (ref 6.4–8.3)
WBC # BLD: 9.4 K/UL (ref 3.5–11.3)
WBC # BLD: NORMAL 10*3/UL

## 2020-02-14 PROCEDURE — 84144 ASSAY OF PROGESTERONE: CPT

## 2020-02-14 PROCEDURE — 85025 COMPLETE CBC W/AUTO DIFF WBC: CPT

## 2020-02-14 PROCEDURE — 80053 COMPREHEN METABOLIC PANEL: CPT

## 2020-02-14 PROCEDURE — 36415 COLL VENOUS BLD VENIPUNCTURE: CPT

## 2020-02-14 PROCEDURE — 70140 X-RAY EXAM OF FACIAL BONES: CPT

## 2020-02-14 PROCEDURE — 82670 ASSAY OF TOTAL ESTRADIOL: CPT

## 2020-02-16 ASSESSMENT — ENCOUNTER SYMPTOMS
COUGH: 0
TROUBLE SWALLOWING: 0
NAUSEA: 0
VOMITING: 0
SORE THROAT: 0
ABDOMINAL PAIN: 0
BACK PAIN: 0
BLOOD IN STOOL: 0
SHORTNESS OF BREATH: 0
CHOKING: 0
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
## 2020-02-25 ENCOUNTER — HOSPITAL ENCOUNTER (OUTPATIENT)
Dept: CT IMAGING | Age: 40
Discharge: HOME | End: 2020-02-27
Payer: COMMERCIAL

## 2020-02-25 PROCEDURE — 6360000004 HC RX CONTRAST MEDICATION: Performed by: SURGERY

## 2020-02-25 PROCEDURE — 74177 CT ABD & PELVIS W/CONTRAST: CPT

## 2020-02-25 RX ADMIN — IOPAMIDOL 18 ML: 755 INJECTION, SOLUTION INTRAVENOUS at 10:48

## 2020-02-25 RX ADMIN — IOPAMIDOL 75 ML: 755 INJECTION, SOLUTION INTRAVENOUS at 10:48

## 2020-02-28 ENCOUNTER — HOSPITAL ENCOUNTER (OUTPATIENT)
Dept: MRI IMAGING | Age: 40
Discharge: HOME | End: 2020-03-01
Payer: COMMERCIAL

## 2020-02-28 PROCEDURE — 77049 MRI BREAST C-+ W/CAD BI: CPT

## 2020-02-28 PROCEDURE — 6360000004 HC RX CONTRAST MEDICATION: Performed by: SURGERY

## 2020-02-28 PROCEDURE — A9579 GAD-BASE MR CONTRAST NOS,1ML: HCPCS | Performed by: SURGERY

## 2020-02-28 PROCEDURE — 2580000003 HC RX 258: Performed by: SURGERY

## 2020-02-28 RX ORDER — 0.9 % SODIUM CHLORIDE 0.9 %
20 INTRAVENOUS SOLUTION INTRAVENOUS
Status: COMPLETED | OUTPATIENT
Start: 2020-02-28 | End: 2020-02-28

## 2020-02-28 RX ORDER — SODIUM CHLORIDE 0.9 % (FLUSH) 0.9 %
10 SYRINGE (ML) INJECTION
Status: COMPLETED | OUTPATIENT
Start: 2020-02-28 | End: 2020-02-28

## 2020-02-28 RX ADMIN — GADOTERIDOL 17 ML: 279.3 INJECTION, SOLUTION INTRAVENOUS at 11:15

## 2020-02-28 RX ADMIN — SODIUM CHLORIDE 20 ML: 9 INJECTION, SOLUTION INTRAVENOUS at 11:15

## 2020-02-28 RX ADMIN — Medication 10 ML: at 11:16

## 2020-03-04 ENCOUNTER — HOSPITAL ENCOUNTER (OUTPATIENT)
Dept: MAMMOGRAPHY | Age: 40
Discharge: HOME | End: 2020-03-06
Payer: COMMERCIAL

## 2020-03-04 VITALS — SYSTOLIC BLOOD PRESSURE: 113 MMHG | HEART RATE: 81 BPM | DIASTOLIC BLOOD PRESSURE: 73 MMHG

## 2020-03-04 PROCEDURE — 88342 IMHCHEM/IMCYTCHM 1ST ANTB: CPT

## 2020-03-04 PROCEDURE — 88360 TUMOR IMMUNOHISTOCHEM/MANUAL: CPT

## 2020-03-04 PROCEDURE — 2500000003 HC RX 250 WO HCPCS

## 2020-03-04 PROCEDURE — 77065 DX MAMMO INCL CAD UNI: CPT

## 2020-03-04 PROCEDURE — 88305 TISSUE EXAM BY PATHOLOGIST: CPT

## 2020-03-04 PROCEDURE — 19081 BX BREAST 1ST LESION STRTCTC: CPT

## 2020-03-04 PROCEDURE — 88341 IMHCHEM/IMCYTCHM EA ADD ANTB: CPT

## 2020-03-09 LAB — SURGICAL PATHOLOGY REPORT: NORMAL

## 2020-03-17 ENCOUNTER — HOSPITAL ENCOUNTER (OUTPATIENT)
Dept: PREADMISSION TESTING | Age: 40
Discharge: HOME | End: 2020-03-21
Payer: COMMERCIAL

## 2020-03-17 VITALS
RESPIRATION RATE: 16 BRPM | HEART RATE: 75 BPM | DIASTOLIC BLOOD PRESSURE: 73 MMHG | BODY MASS INDEX: 30.12 KG/M2 | OXYGEN SATURATION: 100 % | HEIGHT: 65 IN | TEMPERATURE: 98.3 F | WEIGHT: 180.78 LBS | SYSTOLIC BLOOD PRESSURE: 127 MMHG

## 2020-03-17 LAB
ABSOLUTE EOS #: 0.07 K/UL (ref 0–0.44)
ABSOLUTE IMMATURE GRANULOCYTE: 0.02 K/UL (ref 0–0.3)
ABSOLUTE LYMPH #: 2.21 K/UL (ref 1.1–3.7)
ABSOLUTE MONO #: 0.34 K/UL (ref 0.1–1.2)
ANION GAP SERPL CALCULATED.3IONS-SCNC: 11 MMOL/L (ref 9–17)
BASOPHILS # BLD: 1 % (ref 0–2)
BASOPHILS ABSOLUTE: 0.06 K/UL (ref 0–0.2)
BUN BLDV-MCNC: 10 MG/DL (ref 6–20)
BUN/CREAT BLD: 13 (ref 9–20)
CALCIUM SERPL-MCNC: 9.5 MG/DL (ref 8.6–10.4)
CHLORIDE BLD-SCNC: 103 MMOL/L (ref 98–107)
CO2: 25 MMOL/L (ref 20–31)
CREAT SERPL-MCNC: 0.77 MG/DL (ref 0.5–0.9)
DIFFERENTIAL TYPE: NORMAL
EOSINOPHILS RELATIVE PERCENT: 1 % (ref 1–4)
GFR AFRICAN AMERICAN: >60 ML/MIN
GFR NON-AFRICAN AMERICAN: >60 ML/MIN
GFR SERPL CREATININE-BSD FRML MDRD: NORMAL ML/MIN/{1.73_M2}
GFR SERPL CREATININE-BSD FRML MDRD: NORMAL ML/MIN/{1.73_M2}
GLUCOSE BLD-MCNC: 91 MG/DL (ref 70–99)
HCT VFR BLD CALC: 44.3 % (ref 36.3–47.1)
HEMOGLOBIN: 14.1 G/DL (ref 11.9–15.1)
IMMATURE GRANULOCYTES: 0 %
LYMPHOCYTES # BLD: 42 % (ref 24–43)
MCH RBC QN AUTO: 29 PG (ref 25.2–33.5)
MCHC RBC AUTO-ENTMCNC: 31.8 G/DL (ref 28.4–34.8)
MCV RBC AUTO: 91.2 FL (ref 82.6–102.9)
MONOCYTES # BLD: 7 % (ref 3–12)
NRBC AUTOMATED: 0 PER 100 WBC
PDW BLD-RTO: 13.3 % (ref 11.8–14.4)
PLATELET # BLD: 272 K/UL (ref 138–453)
PLATELET ESTIMATE: NORMAL
PMV BLD AUTO: 9.1 FL (ref 8.1–13.5)
POTASSIUM SERPL-SCNC: 4.1 MMOL/L (ref 3.7–5.3)
RBC # BLD: 4.86 M/UL (ref 3.95–5.11)
RBC # BLD: NORMAL 10*6/UL
SEG NEUTROPHILS: 49 % (ref 36–65)
SEGMENTED NEUTROPHILS ABSOLUTE COUNT: 2.56 K/UL (ref 1.5–8.1)
SODIUM BLD-SCNC: 139 MMOL/L (ref 135–144)
WBC # BLD: 5.3 K/UL (ref 3.5–11.3)
WBC # BLD: NORMAL 10*3/UL

## 2020-03-17 PROCEDURE — 36415 COLL VENOUS BLD VENIPUNCTURE: CPT

## 2020-03-17 PROCEDURE — 80048 BASIC METABOLIC PNL TOTAL CA: CPT

## 2020-03-17 PROCEDURE — 85025 COMPLETE CBC W/AUTO DIFF WBC: CPT

## 2020-03-17 NOTE — PRE-PROCEDURE INSTRUCTIONS
contacts are to be worn the day of surgery. You also may bring your hearing aids. Most surgical procedures involving anesthesia will require that you remove your dentures prior to surgery.  If you are on C-PAP or Bi-PAP at home and plan on staying in the hospital overnight for your surgery please bring the machine with you. · Do not wear any jewelry or body piercings day of surgery. Also, NO lotion, perfume or deodorant to be used the day of surgery. No nail polish on the operative extremity (arm/leg surgeries)    ·   If you are staying overnight with us, please bring a small bag of personal items.  Please wear loose, comfortable clothing. If you are potentially going to have a cast or brace bring clothing that will fit over them.  In case of illness - If you have cold or flu like symptoms (high fever, runny nose, sore throat, cough, etc.) rash, nausea, vomiting, loose stools, and/or recent contact with someone who has a contagious disease (chicken pox, measles, etc.) Please call your doctor before coming to the hospital.     If your child is having surgery please make arrangements for any other children to be cared for at home on the day of surgery. Other children are not permitted in recovery room and we want you to be able to spend time with the patient. If other arrangements are not available then we suggest that you have a second adult to stay in the waiting room. Day of Surgery/Procedure:    As a patient at [place] [address] you can expect quality medical and nursing care that is centered on your individual needs. Our goal is to make your surgical experience as comfortable as possible    . Transportation After Your Surgery/Procedure: You will need a friend or family member to drive you home after your procedure.   Your  must be 25years of age or older and able to sign off on your discharge instructions. A taxi cab or any other form of public transportation is not acceptable. Your friend or family member must stay at the hospital throughout your procedure. Someone must remain with you for the first 24 hours after your surgery if you receive anesthesia or medication. If you do not have someone to stay with you, your procedure may be cancelled.       If you have any other questions regarding your procedure or the day of surgery, please call [phone]      _________________________  ____________________________  Signature (Patient)              Signature (Provider) & date

## 2020-03-17 NOTE — H&P
intact. Nose: No drainage noted. Mouth: Mucous membranes moist.  Neck: Supple and no carotid bruits noted. Lungs: Bilateral equal air entry, clear to auscultation, no wheezing, rales or rhonchi, and normal effort. Cardiovascular: Normal rate, regular rhythm, no murmur, gallop, or rub. Abdomen: Soft, non-tender, non-distended, and active bowel sounds. Neurologic: Normal speech and cranial nerves II through XII grossly intact. Strength 5/5 bilaterally. Skin: Small amount of ecchymosis on the left breast. No left breast erythema or drainage surrounding the ecchymosis. No gross lesions, rashes, or bleeding on exposed skin area. Extremities: Posterior tibial pulses 2+ bilaterally. No pedal edema. No calf tenderness with palpation. Psych: Anxious.      Investigations:      Laboratory Testing:  Recent Results (from the past 24 hour(s))   CBC Auto Differential    Collection Time: 03/17/20 10:50 AM   Result Value Ref Range    WBC 5.3 3.5 - 11.3 k/uL    RBC 4.86 3.95 - 5.11 m/uL    Hemoglobin 14.1 11.9 - 15.1 g/dL    Hematocrit 44.3 36.3 - 47.1 %    MCV 91.2 82.6 - 102.9 fL    MCH 29.0 25.2 - 33.5 pg    MCHC 31.8 28.4 - 34.8 g/dL    RDW 13.3 11.8 - 14.4 %    Platelets 447 848 - 462 k/uL    MPV 9.1 8.1 - 13.5 fL    NRBC Automated 0.0 0.0 per 100 WBC    Differential Type NOT REPORTED     Seg Neutrophils 49 36 - 65 %    Lymphocytes 42 24 - 43 %    Monocytes 7 3 - 12 %    Eosinophils % 1 1 - 4 %    Basophils 1 0 - 2 %    Immature Granulocytes 0 0 %    Segs Absolute 2.56 1.50 - 8.10 k/uL    Absolute Lymph # 2.21 1.10 - 3.70 k/uL    Absolute Mono # 0.34 0.10 - 1.20 k/uL    Absolute Eos # 0.07 0.00 - 0.44 k/uL    Basophils Absolute 0.06 0.00 - 0.20 k/uL    Absolute Immature Granulocyte 0.02 0.00 - 0.30 k/uL    WBC Morphology NOT REPORTED     RBC Morphology NOT REPORTED     Platelet Estimate NOT REPORTED        Recent Labs     03/17/20  1050   HGB 14.1   HCT 44.3   WBC 5.3   MCV 91.2       Imaging/Diagnostics:    CT Abdomen Pelvis W Iv Contrast Additional Contrast? Oral    Result Date: 2/25/2020  EXAMINATION: CT OF THE ABDOMEN AND PELVIS WITH CONTRAST 2/25/2020 10:36 am TECHNIQUE: CT of the abdomen and pelvis was performed with the administration of intravenous contrast. Multiplanar reformatted images are provided for review. Dose modulation, iterative reconstruction, and/or weight based adjustment of the mA/kV was utilized to reduce the radiation dose to as low as reasonably achievable. COMPARISON: None. HISTORY: ORDERING SYSTEM PROVIDED HISTORY: Ductal carcinoma in situ (DCIS) of right breast TECHNOLOGIST PROVIDED HISTORY: FINDINGS: Lower Chest: The visualized lung bases are clear. Organs: Cholelithiasis. The liver, pancreas, spleen, adrenals and kidneys reveal no acute findings. GI/Bowel: There is no bowel dilatation or wall thickening identified. Pelvis: No acute findings. Peritoneum/Retroperitoneum: No free air or free fluid. The aorta is normal in caliber. The visceral branches are patent. No lymphadenopathy. Bones/Soft Tissues: No abnormality identified. No acute findings identified. Cholelithiasis. MRI Breast Bilateral W Wo Contrast    Result Date: 2/28/2020  EXAMINATION: MRI OF THE BILATERAL BREASTS WITHOUT AND WITH CONTRAST 2/28/2020 10:43 am: TECHNIQUE: Multiplanar multisequence MRI of the bilateral breasts were performed without and with the administration of intravenous contrast. Data analysis was performed with color parametric mapping, image subtraction, and 3D reconstructions. COMPARISON: Mammography 11/27/2019 and biopsy 12/20/2019 HISTORY: ORDERING SYSTEM PROVIDED HISTORY: Intraductal carcinoma in situ of right breast TECHNOLOGIST PROVIDED HISTORY: Is the patient pregnant?->No Reason for Exam:  Newly diagnosed Right breast CA. Staging. Also has discharge from left breast FINDINGS: BACKGROUND PARENCHYMAL ENHANCEMENT: Mild. FIBROGLANDULAR TISSUE: Heterogeneous fibroglandular tissue.  RIGHT BREAST: Abnormal clumped non masslike enhancement involves the central and inferior breast extending 7.4 cm in AP dimension, 3.8 cm in transverse dimension and 3.4 cm in craniocaudal dimension. This corresponds to the diffuse calcifications seen mammographically. LEFT BREAST: Small focus of enhancement is noted in the anterior 12 o'clock region, 3 cm from the nipple. No other evidence for mass or suspicious non masslike enhancement. EXTRAMAMMARY STRUCTURES: Scattered small axillary lymph nodes bilaterally without suspicious morphology. No internal mammary lymphadenopathy. No abnormal signal identified within the visualized osseous structures, lung parenchyma or upper abdomen. 1.  Large distribution of abnormal non masslike enhancement involving the central and inferior aspect of the right breast, corresponding to the large region of microcalcifications previously biopsied revealing DCIS. 2.  Small focus of enhancement in the anterior 12 o'clock left breast, which appears to correspond to a small cluster of calcifications mammographically in this region. Recommend stereotactic biopsy of these calcifications in the left breast.  Alternatively, MRI guided biopsy could also be performed. BI-RADS 6 BIRADS: BIRADS - CATEGORY 6 Known biopsy proven malignancy. Appropriate action should be taken. OVERALL ASSESSMENT - KNOWN BIOPSY PROVEN MALIGNANCY. [facility] Breast Biopsy Left    Addendum Date: 3/9/2020    ADDENDUM: Radiology/pathology correlation: Pathology results from a stereotactic guided biopsy performed on calcifications in the left breast demonstrates ductal carcinoma in situ, intermediate grade, with focal necrosis, calcifications, and lobular extension. The result is malignant and concordant. Patient has known malignancy in the right breast as well. Surgical and oncologic consultation are recommended. BI-RADS 6 BIRADS - CATEGORY 6 Known biopsy proven malignancy. Appropriate action should be taken.  OVERALL ASSESSMENT - KNOWN BIOPSY PROVEN MALIGNANCY. Result Date: 3/9/2020  EXAMINATION: STEREOTACTIC left BREAST BIOPSY WITH VACUUM-ASSIST STEREOTACTIC CLIP PLACEMENT SPECIMEN RADIOGRAPH POSTPROCEDURE UNILATERAL DIGITAL MAMMOGRAM 03/04/2020 HISTORY: ORDERING SYSTEM PROVIDED HISTORY: Abnormal mammogram TECHNOLOGIST PROVIDED HISTORY: Is the patient pregnant?->No Right breast cancer. Abnormal MRI findings in the left breast. COMPARISON: Mammogram dated 11/27/2019 and MRI dated 02/28/2020. PROCEDURE: A timeout was performed to confirm patient identification and site of procedure. Risks, benefits, and alternatives of the procedure were discussed. Informed written consent was obtained. The patient was prepped and draped in the standard sterile fashion. Buffered 2% lidocaine was used for superficial local anesthesia. 2% lidocaine with epinephrine was used for deeper local anesthesia. A small skin incision was made. Using a superior approach, a 12 gauge vacuum assist needle was positioned under stereotactic guidance. 7 core samples were obtained using dense setting. FINDINGS: CLIP PLACEMENT: Following the tissue sampling, a M shaped stereotactic clip was placed at the biopsy site. The stereotactic needle was removed and pressure applied for hemostasis. The patient tolerated the procedure well with no immediate complications. SPECIMEN RADIOGRAPH: A specimen radiograph was performed showing calcifications in 2 of the core samples. POST-PROCEDURE MAMMOGRAM: ML and CC views were performed and compared to the pre biopsy images. The biopsy clip is in the correct location with respect to the targeted site. There is no evidence of biopsy clip migration from the biopsy site. Technically successful stereotactic biopsy of the left breast calcifications and clip marker placement as described above. BIRADS: ZW - Pathology pending.      Mammogram Post Bx Clip Placement Left    Addendum Date: 3/9/2020    ADDENDUM: Radiology/pathology correlation: Pathology results from a stereotactic guided biopsy performed on calcifications in the left breast demonstrates ductal carcinoma in situ, intermediate grade, with focal necrosis, calcifications, and lobular extension. The result is malignant and concordant. Patient has known malignancy in the right breast as well. Surgical and oncologic consultation are recommended. BI-RADS 6 BIRADS - CATEGORY 6 Known biopsy proven malignancy. Appropriate action should be taken. OVERALL ASSESSMENT - KNOWN BIOPSY PROVEN MALIGNANCY. Result Date: 3/9/2020  EXAMINATION: STEREOTACTIC left BREAST BIOPSY WITH VACUUM-ASSIST STEREOTACTIC CLIP PLACEMENT SPECIMEN RADIOGRAPH POSTPROCEDURE UNILATERAL DIGITAL MAMMOGRAM 03/04/2020 HISTORY: ORDERING SYSTEM PROVIDED HISTORY: Abnormal mammogram TECHNOLOGIST PROVIDED HISTORY: Is the patient pregnant?->No Right breast cancer. Abnormal MRI findings in the left breast. COMPARISON: Mammogram dated 11/27/2019 and MRI dated 02/28/2020. PROCEDURE: A timeout was performed to confirm patient identification and site of procedure. Risks, benefits, and alternatives of the procedure were discussed. Informed written consent was obtained. The patient was prepped and draped in the standard sterile fashion. Buffered 2% lidocaine was used for superficial local anesthesia. 2% lidocaine with epinephrine was used for deeper local anesthesia. A small skin incision was made. Using a superior approach, a 12 gauge vacuum assist needle was positioned under stereotactic guidance. 7 core samples were obtained using dense setting. FINDINGS: CLIP PLACEMENT: Following the tissue sampling, a M shaped stereotactic clip was placed at the biopsy site. The stereotactic needle was removed and pressure applied for hemostasis. The patient tolerated the procedure well with no immediate complications. SPECIMEN RADIOGRAPH: A specimen radiograph was performed showing calcifications in 2 of the core samples.

## 2020-03-30 ENCOUNTER — ANESTHESIA EVENT (OUTPATIENT)
Dept: OPERATING ROOM | Age: 40
End: 2020-03-30
Payer: COMMERCIAL

## 2020-04-03 ENCOUNTER — HOSPITAL ENCOUNTER (OUTPATIENT)
Age: 40
Setting detail: OBSERVATION
Discharge: HOME | End: 2020-04-04
Attending: SURGERY | Admitting: SURGERY
Payer: COMMERCIAL

## 2020-04-03 ENCOUNTER — HOSPITAL ENCOUNTER (OUTPATIENT)
Dept: NUCLEAR MEDICINE | Age: 40
Discharge: HOME | End: 2020-04-05
Payer: COMMERCIAL

## 2020-04-03 ENCOUNTER — ANESTHESIA (OUTPATIENT)
Dept: OPERATING ROOM | Age: 40
End: 2020-04-03
Payer: COMMERCIAL

## 2020-04-03 VITALS — TEMPERATURE: 99.5 F | DIASTOLIC BLOOD PRESSURE: 55 MMHG | SYSTOLIC BLOOD PRESSURE: 98 MMHG | OXYGEN SATURATION: 99 %

## 2020-04-03 DIAGNOSIS — D05.12 BILATERAL DUCTAL CARCINOMA IN SITU OF BREASTS: ICD-10-CM

## 2020-04-03 DIAGNOSIS — D05.11 BILATERAL DUCTAL CARCINOMA IN SITU OF BREASTS: ICD-10-CM

## 2020-04-03 LAB — HCG, PREGNANCY URINE (POC): NEGATIVE

## 2020-04-03 PROCEDURE — A9520 TC99 TILMANOCEPT DIAG 0.5MCI: HCPCS | Performed by: SURGERY

## 2020-04-03 PROCEDURE — 3700000001 HC ADD 15 MINUTES (ANESTHESIA): Performed by: SURGERY

## 2020-04-03 PROCEDURE — 3600000013 HC SURGERY LEVEL 3 ADDTL 15MIN: Performed by: SURGERY

## 2020-04-03 PROCEDURE — 6360000002 HC RX W HCPCS: Performed by: ANESTHESIOLOGY

## 2020-04-03 PROCEDURE — 3430000000 HC RX DIAGNOSTIC RADIOPHARMACEUTICAL: Performed by: SURGERY

## 2020-04-03 PROCEDURE — C2626 INFUSION PUMP, NON-PROG,TEMP: HCPCS | Performed by: SURGERY

## 2020-04-03 PROCEDURE — 6360000002 HC RX W HCPCS: Performed by: NURSE ANESTHETIST, CERTIFIED REGISTERED

## 2020-04-03 PROCEDURE — 2580000003 HC RX 258: Performed by: SURGERY

## 2020-04-03 PROCEDURE — 6360000002 HC RX W HCPCS: Performed by: SURGERY

## 2020-04-03 PROCEDURE — 2580000003 HC RX 258: Performed by: NURSE ANESTHETIST, CERTIFIED REGISTERED

## 2020-04-03 PROCEDURE — 6360000002 HC RX W HCPCS: Performed by: PLASTIC SURGERY

## 2020-04-03 PROCEDURE — 2580000003 HC RX 258: Performed by: PLASTIC SURGERY

## 2020-04-03 PROCEDURE — 2500000003 HC RX 250 WO HCPCS: Performed by: SURGERY

## 2020-04-03 PROCEDURE — C9290 INJ, BUPIVACAINE LIPOSOME: HCPCS | Performed by: PLASTIC SURGERY

## 2020-04-03 PROCEDURE — 78195 LYMPH SYSTEM IMAGING: CPT

## 2020-04-03 PROCEDURE — 3700000000 HC ANESTHESIA ATTENDED CARE: Performed by: SURGERY

## 2020-04-03 PROCEDURE — 88305 TISSUE EXAM BY PATHOLOGIST: CPT

## 2020-04-03 PROCEDURE — 6360000002 HC RX W HCPCS

## 2020-04-03 PROCEDURE — 2580000003 HC RX 258: Performed by: ANESTHESIOLOGY

## 2020-04-03 PROCEDURE — 2720000010 HC SURG SUPPLY STERILE: Performed by: SURGERY

## 2020-04-03 PROCEDURE — 7100000000 HC PACU RECOVERY - FIRST 15 MIN: Performed by: SURGERY

## 2020-04-03 PROCEDURE — 2709999900 HC NON-CHARGEABLE SUPPLY: Performed by: SURGERY

## 2020-04-03 PROCEDURE — C1789 PROSTHESIS, BREAST, IMP: HCPCS | Performed by: SURGERY

## 2020-04-03 PROCEDURE — 2500000003 HC RX 250 WO HCPCS: Performed by: NURSE ANESTHETIST, CERTIFIED REGISTERED

## 2020-04-03 PROCEDURE — 81025 URINE PREGNANCY TEST: CPT

## 2020-04-03 PROCEDURE — L8000 MASTECTOMY BRA: HCPCS | Performed by: SURGERY

## 2020-04-03 PROCEDURE — 2780000010 HC IMPLANT OTHER: Performed by: SURGERY

## 2020-04-03 PROCEDURE — 3600000003 HC SURGERY LEVEL 3 BASE: Performed by: SURGERY

## 2020-04-03 PROCEDURE — 6370000000 HC RX 637 (ALT 250 FOR IP): Performed by: SURGERY

## 2020-04-03 PROCEDURE — 6370000000 HC RX 637 (ALT 250 FOR IP): Performed by: ANESTHESIOLOGY

## 2020-04-03 PROCEDURE — G0378 HOSPITAL OBSERVATION PER HR: HCPCS

## 2020-04-03 PROCEDURE — 88331 PATH CONSLTJ SURG 1 BLK 1SPC: CPT

## 2020-04-03 PROCEDURE — 7100000001 HC PACU RECOVERY - ADDTL 15 MIN: Performed by: SURGERY

## 2020-04-03 PROCEDURE — 88332 PATH CONSLTJ SURG EA ADD BLK: CPT

## 2020-04-03 PROCEDURE — 88307 TISSUE EXAM BY PATHOLOGIST: CPT

## 2020-04-03 DEVICE — GRAFT DERM ALLGRFT DERM 1MM 16X20CM CORTIVA: Type: IMPLANTABLE DEVICE | Site: BREAST | Status: FUNCTIONAL

## 2020-04-03 DEVICE — EXPANDER TISS 6.4-7.9 CM PROJCT 15X13.8 CM 600-720 CC FULL: Type: IMPLANTABLE DEVICE | Site: BREAST | Status: FUNCTIONAL

## 2020-04-03 RX ORDER — PROPOFOL 10 MG/ML
INJECTION, EMULSION INTRAVENOUS CONTINUOUS PRN
Status: DISCONTINUED | OUTPATIENT
Start: 2020-04-03 | End: 2020-04-03 | Stop reason: SDUPTHER

## 2020-04-03 RX ORDER — FENTANYL CITRATE 50 UG/ML
INJECTION, SOLUTION INTRAMUSCULAR; INTRAVENOUS
Status: COMPLETED
Start: 2020-04-03 | End: 2020-04-03

## 2020-04-03 RX ORDER — KETOROLAC TROMETHAMINE 30 MG/ML
30 INJECTION, SOLUTION INTRAMUSCULAR; INTRAVENOUS EVERY 6 HOURS
Status: DISCONTINUED | OUTPATIENT
Start: 2020-04-04 | End: 2020-04-04 | Stop reason: HOSPADM

## 2020-04-03 RX ORDER — FENTANYL CITRATE 50 UG/ML
25 INJECTION, SOLUTION INTRAMUSCULAR; INTRAVENOUS EVERY 5 MIN PRN
Status: COMPLETED | OUTPATIENT
Start: 2020-04-03 | End: 2020-04-03

## 2020-04-03 RX ORDER — ONDANSETRON 2 MG/ML
4 INJECTION INTRAMUSCULAR; INTRAVENOUS
Status: DISCONTINUED | OUTPATIENT
Start: 2020-04-03 | End: 2020-04-03 | Stop reason: HOSPADM

## 2020-04-03 RX ORDER — ONDANSETRON 2 MG/ML
4 INJECTION INTRAMUSCULAR; INTRAVENOUS EVERY 6 HOURS PRN
Status: DISCONTINUED | OUTPATIENT
Start: 2020-04-03 | End: 2020-04-04 | Stop reason: HOSPADM

## 2020-04-03 RX ORDER — OXYCODONE HYDROCHLORIDE 5 MG/1
10 TABLET ORAL EVERY 4 HOURS PRN
Status: DISCONTINUED | OUTPATIENT
Start: 2020-04-03 | End: 2020-04-04 | Stop reason: HOSPADM

## 2020-04-03 RX ORDER — MAGNESIUM SULFATE 1 G/100ML
1 INJECTION INTRAVENOUS
Status: DISCONTINUED | OUTPATIENT
Start: 2020-04-03 | End: 2020-04-03

## 2020-04-03 RX ORDER — PROMETHAZINE HYDROCHLORIDE 12.5 MG/1
12.5 TABLET ORAL EVERY 6 HOURS PRN
Status: DISCONTINUED | OUTPATIENT
Start: 2020-04-03 | End: 2020-04-04 | Stop reason: HOSPADM

## 2020-04-03 RX ORDER — ACETAMINOPHEN 325 MG/1
650 TABLET ORAL EVERY 6 HOURS
Status: DISCONTINUED | OUTPATIENT
Start: 2020-04-03 | End: 2020-04-04 | Stop reason: HOSPADM

## 2020-04-03 RX ORDER — LIDOCAINE HYDROCHLORIDE 20 MG/ML
INJECTION, SOLUTION EPIDURAL; INFILTRATION; INTRACAUDAL; PERINEURAL PRN
Status: DISCONTINUED | OUTPATIENT
Start: 2020-04-03 | End: 2020-04-03 | Stop reason: SDUPTHER

## 2020-04-03 RX ORDER — FENTANYL CITRATE 50 UG/ML
INJECTION, SOLUTION INTRAMUSCULAR; INTRAVENOUS PRN
Status: DISCONTINUED | OUTPATIENT
Start: 2020-04-03 | End: 2020-04-03 | Stop reason: SDUPTHER

## 2020-04-03 RX ORDER — DEXAMETHASONE SODIUM PHOSPHATE 10 MG/ML
INJECTION INTRAMUSCULAR; INTRAVENOUS PRN
Status: DISCONTINUED | OUTPATIENT
Start: 2020-04-03 | End: 2020-04-03 | Stop reason: SDUPTHER

## 2020-04-03 RX ORDER — SODIUM CHLORIDE, SODIUM LACTATE, POTASSIUM CHLORIDE, CALCIUM CHLORIDE 600; 310; 30; 20 MG/100ML; MG/100ML; MG/100ML; MG/100ML
INJECTION, SOLUTION INTRAVENOUS CONTINUOUS PRN
Status: DISCONTINUED | OUTPATIENT
Start: 2020-04-03 | End: 2020-04-03 | Stop reason: SDUPTHER

## 2020-04-03 RX ORDER — ONDANSETRON 2 MG/ML
INJECTION INTRAMUSCULAR; INTRAVENOUS PRN
Status: DISCONTINUED | OUTPATIENT
Start: 2020-04-03 | End: 2020-04-03 | Stop reason: SDUPTHER

## 2020-04-03 RX ORDER — ACETAMINOPHEN 500 MG
1000 TABLET ORAL ONCE
Status: COMPLETED | OUTPATIENT
Start: 2020-04-03 | End: 2020-04-03

## 2020-04-03 RX ORDER — DEXTROSE, SODIUM CHLORIDE, AND POTASSIUM CHLORIDE 5; .9; .15 G/100ML; G/100ML; G/100ML
INJECTION INTRAVENOUS CONTINUOUS
Status: DISCONTINUED | OUTPATIENT
Start: 2020-04-03 | End: 2020-04-04 | Stop reason: HOSPADM

## 2020-04-03 RX ORDER — HYDROMORPHONE HCL 110MG/55ML
0.25 PATIENT CONTROLLED ANALGESIA SYRINGE INTRAVENOUS EVERY 5 MIN PRN
Status: DISCONTINUED | OUTPATIENT
Start: 2020-04-03 | End: 2020-04-03 | Stop reason: HOSPADM

## 2020-04-03 RX ORDER — KETAMINE HCL IN NACL, ISO-OSM 100MG/10ML
SYRINGE (ML) INJECTION PRN
Status: DISCONTINUED | OUTPATIENT
Start: 2020-04-03 | End: 2020-04-03 | Stop reason: SDUPTHER

## 2020-04-03 RX ORDER — LIDOCAINE 40 MG/G
CREAM TOPICAL PRN
Status: CANCELLED | OUTPATIENT
Start: 2020-04-03

## 2020-04-03 RX ORDER — SODIUM CHLORIDE 0.9 % (FLUSH) 0.9 %
10 SYRINGE (ML) INJECTION EVERY 12 HOURS SCHEDULED
Status: DISCONTINUED | OUTPATIENT
Start: 2020-04-03 | End: 2020-04-04 | Stop reason: HOSPADM

## 2020-04-03 RX ORDER — OXYCODONE HYDROCHLORIDE 5 MG/1
5 TABLET ORAL EVERY 4 HOURS PRN
Status: DISCONTINUED | OUTPATIENT
Start: 2020-04-03 | End: 2020-04-04 | Stop reason: HOSPADM

## 2020-04-03 RX ORDER — LIDOCAINE 40 MG/G
CREAM TOPICAL PRN
Status: DISCONTINUED | OUTPATIENT
Start: 2020-04-03 | End: 2020-04-03 | Stop reason: HOSPADM

## 2020-04-03 RX ORDER — MIDAZOLAM HYDROCHLORIDE 1 MG/ML
INJECTION INTRAMUSCULAR; INTRAVENOUS PRN
Status: DISCONTINUED | OUTPATIENT
Start: 2020-04-03 | End: 2020-04-03 | Stop reason: SDUPTHER

## 2020-04-03 RX ORDER — PROPOFOL 10 MG/ML
INJECTION, EMULSION INTRAVENOUS PRN
Status: DISCONTINUED | OUTPATIENT
Start: 2020-04-03 | End: 2020-04-03 | Stop reason: SDUPTHER

## 2020-04-03 RX ORDER — SODIUM CHLORIDE 0.9 % (FLUSH) 0.9 %
10 SYRINGE (ML) INJECTION PRN
Status: DISCONTINUED | OUTPATIENT
Start: 2020-04-03 | End: 2020-04-04 | Stop reason: HOSPADM

## 2020-04-03 RX ORDER — ROCURONIUM BROMIDE 10 MG/ML
INJECTION, SOLUTION INTRAVENOUS PRN
Status: DISCONTINUED | OUTPATIENT
Start: 2020-04-03 | End: 2020-04-03 | Stop reason: SDUPTHER

## 2020-04-03 RX ORDER — GABAPENTIN 300 MG/1
300 CAPSULE ORAL ONCE
Status: COMPLETED | OUTPATIENT
Start: 2020-04-03 | End: 2020-04-03

## 2020-04-03 RX ORDER — MAGNESIUM SULFATE 1 G/100ML
INJECTION INTRAVENOUS PRN
Status: DISCONTINUED | OUTPATIENT
Start: 2020-04-03 | End: 2020-04-03 | Stop reason: SDUPTHER

## 2020-04-03 RX ORDER — SCOLOPAMINE TRANSDERMAL SYSTEM 1 MG/1
1 PATCH, EXTENDED RELEASE TRANSDERMAL ONCE
Status: DISCONTINUED | OUTPATIENT
Start: 2020-04-03 | End: 2020-04-03

## 2020-04-03 RX ORDER — SODIUM CHLORIDE, SODIUM LACTATE, POTASSIUM CHLORIDE, CALCIUM CHLORIDE 600; 310; 30; 20 MG/100ML; MG/100ML; MG/100ML; MG/100ML
INJECTION, SOLUTION INTRAVENOUS CONTINUOUS
Status: DISCONTINUED | OUTPATIENT
Start: 2020-04-03 | End: 2020-04-03

## 2020-04-03 RX ORDER — GABAPENTIN 300 MG/1
600 CAPSULE ORAL ONCE
Status: COMPLETED | OUTPATIENT
Start: 2020-04-03 | End: 2020-04-03

## 2020-04-03 RX ADMIN — GABAPENTIN 600 MG: 300 CAPSULE ORAL at 09:55

## 2020-04-03 RX ADMIN — FENTANYL CITRATE 25 MCG: 50 INJECTION, SOLUTION INTRAMUSCULAR; INTRAVENOUS at 17:16

## 2020-04-03 RX ADMIN — TILMANOCEPT 0.6 MILLICURIE: KIT at 09:02

## 2020-04-03 RX ADMIN — FENTANYL CITRATE 25 MCG: 50 INJECTION, SOLUTION INTRAMUSCULAR; INTRAVENOUS at 18:17

## 2020-04-03 RX ADMIN — ACETAMINOPHEN 650 MG: 325 TABLET ORAL at 22:02

## 2020-04-03 RX ADMIN — CEFAZOLIN SODIUM 2 G: 10 INJECTION, POWDER, FOR SOLUTION INTRAVENOUS at 10:57

## 2020-04-03 RX ADMIN — SODIUM CHLORIDE, POTASSIUM CHLORIDE, SODIUM LACTATE AND CALCIUM CHLORIDE: 600; 310; 30; 20 INJECTION, SOLUTION INTRAVENOUS at 11:55

## 2020-04-03 RX ADMIN — MAGNESIUM SULFATE HEPTAHYDRATE 1 G: 1 INJECTION, SOLUTION INTRAVENOUS at 11:09

## 2020-04-03 RX ADMIN — ROCURONIUM BROMIDE 50 MG: 10 INJECTION, SOLUTION INTRAVENOUS at 10:25

## 2020-04-03 RX ADMIN — Medication 50 MG: at 10:35

## 2020-04-03 RX ADMIN — FENTANYL CITRATE 25 MCG: 50 INJECTION, SOLUTION INTRAMUSCULAR; INTRAVENOUS at 17:23

## 2020-04-03 RX ADMIN — GABAPENTIN 300 MG: 300 CAPSULE ORAL at 22:01

## 2020-04-03 RX ADMIN — CEFAZOLIN 1 G: 1 INJECTION, POWDER, FOR SOLUTION INTRAMUSCULAR; INTRAVENOUS at 22:02

## 2020-04-03 RX ADMIN — TILMANOCEPT 0.6 MILLICURIE: KIT at 09:00

## 2020-04-03 RX ADMIN — FENTANYL CITRATE 50 MCG: 50 INJECTION, SOLUTION INTRAMUSCULAR; INTRAVENOUS at 11:24

## 2020-04-03 RX ADMIN — PROPOFOL: 10 INJECTION, EMULSION INTRAVENOUS at 14:23

## 2020-04-03 RX ADMIN — CEFAZOLIN SODIUM 2 G: 10 INJECTION, POWDER, FOR SOLUTION INTRAVENOUS at 14:57

## 2020-04-03 RX ADMIN — MAGNESIUM SULFATE HEPTAHYDRATE 1 G: 1 INJECTION, SOLUTION INTRAVENOUS at 10:36

## 2020-04-03 RX ADMIN — ACETAMINOPHEN 1000 MG: 500 TABLET ORAL at 09:55

## 2020-04-03 RX ADMIN — SODIUM CHLORIDE, POTASSIUM CHLORIDE, SODIUM LACTATE AND CALCIUM CHLORIDE: 600; 310; 30; 20 INJECTION, SOLUTION INTRAVENOUS at 10:21

## 2020-04-03 RX ADMIN — FENTANYL CITRATE 50 MCG: 50 INJECTION, SOLUTION INTRAMUSCULAR; INTRAVENOUS at 10:21

## 2020-04-03 RX ADMIN — ONDANSETRON 4 MG: 2 INJECTION, SOLUTION INTRAMUSCULAR; INTRAVENOUS at 15:46

## 2020-04-03 RX ADMIN — SODIUM CHLORIDE, POTASSIUM CHLORIDE, SODIUM LACTATE AND CALCIUM CHLORIDE: 600; 310; 30; 20 INJECTION, SOLUTION INTRAVENOUS at 08:18

## 2020-04-03 RX ADMIN — DEXTROSE, SODIUM CHLORIDE, AND POTASSIUM CHLORIDE: 5; .9; .15 INJECTION INTRAVENOUS at 22:02

## 2020-04-03 RX ADMIN — DEXAMETHASONE SODIUM PHOSPHATE 10 MG: 10 INJECTION INTRAMUSCULAR; INTRAVENOUS at 10:25

## 2020-04-03 RX ADMIN — FENTANYL CITRATE 25 MCG: 50 INJECTION, SOLUTION INTRAMUSCULAR; INTRAVENOUS at 17:51

## 2020-04-03 RX ADMIN — FENTANYL CITRATE 25 MCG: 50 INJECTION, SOLUTION INTRAMUSCULAR; INTRAVENOUS at 17:38

## 2020-04-03 RX ADMIN — Medication 10 MG: at 14:24

## 2020-04-03 RX ADMIN — PROPOFOL 50 MCG/KG/MIN: 10 INJECTION, EMULSION INTRAVENOUS at 10:35

## 2020-04-03 RX ADMIN — LIDOCAINE HYDROCHLORIDE 100 MG: 20 INJECTION, SOLUTION EPIDURAL; INFILTRATION; INTRACAUDAL; PERINEURAL at 10:25

## 2020-04-03 RX ADMIN — PROPOFOL 200 MG: 10 INJECTION, EMULSION INTRAVENOUS at 10:25

## 2020-04-03 RX ADMIN — FENTANYL CITRATE 50 MCG: 50 INJECTION, SOLUTION INTRAMUSCULAR; INTRAVENOUS at 12:42

## 2020-04-03 RX ADMIN — LIDOCAINE 4%: 4 CREAM TOPICAL at 07:54

## 2020-04-03 RX ADMIN — MIDAZOLAM 2 MG: 1 INJECTION INTRAMUSCULAR; INTRAVENOUS at 10:21

## 2020-04-03 RX ADMIN — PHENYLEPHRINE HYDROCHLORIDE 50 MCG: 10 INJECTION INTRAVENOUS at 15:42

## 2020-04-03 ASSESSMENT — PULMONARY FUNCTION TESTS
PIF_VALUE: 16
PIF_VALUE: 17
PIF_VALUE: 17
PIF_VALUE: 16
PIF_VALUE: 17
PIF_VALUE: 16
PIF_VALUE: 17
PIF_VALUE: 16
PIF_VALUE: 16
PIF_VALUE: 17
PIF_VALUE: 16
PIF_VALUE: 11
PIF_VALUE: 16
PIF_VALUE: 16
PIF_VALUE: 14
PIF_VALUE: 17
PIF_VALUE: 16
PIF_VALUE: 16
PIF_VALUE: 14
PIF_VALUE: 16
PIF_VALUE: 17
PIF_VALUE: 16
PIF_VALUE: 16
PIF_VALUE: 14
PIF_VALUE: 18
PIF_VALUE: 17
PIF_VALUE: 16
PIF_VALUE: 17
PIF_VALUE: 17
PIF_VALUE: 16
PIF_VALUE: 10
PIF_VALUE: 14
PIF_VALUE: 16
PIF_VALUE: 17
PIF_VALUE: 16
PIF_VALUE: 16
PIF_VALUE: 17
PIF_VALUE: 11
PIF_VALUE: 16
PIF_VALUE: 17
PIF_VALUE: 16
PIF_VALUE: 16
PIF_VALUE: 15
PIF_VALUE: 17
PIF_VALUE: 12
PIF_VALUE: 16
PIF_VALUE: 17
PIF_VALUE: 16
PIF_VALUE: 17
PIF_VALUE: 14
PIF_VALUE: 16
PIF_VALUE: 16
PIF_VALUE: 17
PIF_VALUE: 1
PIF_VALUE: 17
PIF_VALUE: 12
PIF_VALUE: 16
PIF_VALUE: 16
PIF_VALUE: 17
PIF_VALUE: 26
PIF_VALUE: 18
PIF_VALUE: 1
PIF_VALUE: 15
PIF_VALUE: 16
PIF_VALUE: 17
PIF_VALUE: 17
PIF_VALUE: 16
PIF_VALUE: 17
PIF_VALUE: 4
PIF_VALUE: 17
PIF_VALUE: 14
PIF_VALUE: 17
PIF_VALUE: 17
PIF_VALUE: 18
PIF_VALUE: 17
PIF_VALUE: 17
PIF_VALUE: 14
PIF_VALUE: 2
PIF_VALUE: 17
PIF_VALUE: 16
PIF_VALUE: 15
PIF_VALUE: 14
PIF_VALUE: 16
PIF_VALUE: 17
PIF_VALUE: 17
PIF_VALUE: 16
PIF_VALUE: 17
PIF_VALUE: 18
PIF_VALUE: 14
PIF_VALUE: 16
PIF_VALUE: 17
PIF_VALUE: 20
PIF_VALUE: 17
PIF_VALUE: 17
PIF_VALUE: 16
PIF_VALUE: 17
PIF_VALUE: 4
PIF_VALUE: 18
PIF_VALUE: 17
PIF_VALUE: 16
PIF_VALUE: 16
PIF_VALUE: 17
PIF_VALUE: 12
PIF_VALUE: 16
PIF_VALUE: 17
PIF_VALUE: 17
PIF_VALUE: 14
PIF_VALUE: 17
PIF_VALUE: 17
PIF_VALUE: 16
PIF_VALUE: 17
PIF_VALUE: 18
PIF_VALUE: 16
PIF_VALUE: 16
PIF_VALUE: 17
PIF_VALUE: 20
PIF_VALUE: 16
PIF_VALUE: 14
PIF_VALUE: 16
PIF_VALUE: 18
PIF_VALUE: 17
PIF_VALUE: 16
PIF_VALUE: 12
PIF_VALUE: 17
PIF_VALUE: 14
PIF_VALUE: 16
PIF_VALUE: 14
PIF_VALUE: 17
PIF_VALUE: 16
PIF_VALUE: 17
PIF_VALUE: 18
PIF_VALUE: 17
PIF_VALUE: 17
PIF_VALUE: 16
PIF_VALUE: 14
PIF_VALUE: 17
PIF_VALUE: 17
PIF_VALUE: 14
PIF_VALUE: 0
PIF_VALUE: 17
PIF_VALUE: 16
PIF_VALUE: 15
PIF_VALUE: 17
PIF_VALUE: 16
PIF_VALUE: 17
PIF_VALUE: 16
PIF_VALUE: 12
PIF_VALUE: 18
PIF_VALUE: 16
PIF_VALUE: 16
PIF_VALUE: 17
PIF_VALUE: 16
PIF_VALUE: 16
PIF_VALUE: 17
PIF_VALUE: 16
PIF_VALUE: 16
PIF_VALUE: 17
PIF_VALUE: 16
PIF_VALUE: 16
PIF_VALUE: 18
PIF_VALUE: 17
PIF_VALUE: 16
PIF_VALUE: 17
PIF_VALUE: 16
PIF_VALUE: 16
PIF_VALUE: 17
PIF_VALUE: 17
PIF_VALUE: 16
PIF_VALUE: 17
PIF_VALUE: 17
PIF_VALUE: 16
PIF_VALUE: 17
PIF_VALUE: 17
PIF_VALUE: 0
PIF_VALUE: 16
PIF_VALUE: 17
PIF_VALUE: 16
PIF_VALUE: 16
PIF_VALUE: 10
PIF_VALUE: 17
PIF_VALUE: 16
PIF_VALUE: 17
PIF_VALUE: 16
PIF_VALUE: 17
PIF_VALUE: 16
PIF_VALUE: 16
PIF_VALUE: 17
PIF_VALUE: 17
PIF_VALUE: 14
PIF_VALUE: 17
PIF_VALUE: 17
PIF_VALUE: 10
PIF_VALUE: 17
PIF_VALUE: 15
PIF_VALUE: 17
PIF_VALUE: 10
PIF_VALUE: 15
PIF_VALUE: 11
PIF_VALUE: 16
PIF_VALUE: 16
PIF_VALUE: 14
PIF_VALUE: 16
PIF_VALUE: 16
PIF_VALUE: 14
PIF_VALUE: 17
PIF_VALUE: 16
PIF_VALUE: 17
PIF_VALUE: 16
PIF_VALUE: 17
PIF_VALUE: 17
PIF_VALUE: 16
PIF_VALUE: 16
PIF_VALUE: 17
PIF_VALUE: 17
PIF_VALUE: 16
PIF_VALUE: 17
PIF_VALUE: 9
PIF_VALUE: 17
PIF_VALUE: 15
PIF_VALUE: 17
PIF_VALUE: 17
PIF_VALUE: 16
PIF_VALUE: 17
PIF_VALUE: 17
PIF_VALUE: 16
PIF_VALUE: 16
PIF_VALUE: 14
PIF_VALUE: 17
PIF_VALUE: 16
PIF_VALUE: 17
PIF_VALUE: 14
PIF_VALUE: 16
PIF_VALUE: 17
PIF_VALUE: 17
PIF_VALUE: 14
PIF_VALUE: 17
PIF_VALUE: 16
PIF_VALUE: 17
PIF_VALUE: 16
PIF_VALUE: 16
PIF_VALUE: 17
PIF_VALUE: 14
PIF_VALUE: 17
PIF_VALUE: 17
PIF_VALUE: 14
PIF_VALUE: 17
PIF_VALUE: 14
PIF_VALUE: 16
PIF_VALUE: 14
PIF_VALUE: 14
PIF_VALUE: 16
PIF_VALUE: 1
PIF_VALUE: 16
PIF_VALUE: 17
PIF_VALUE: 14
PIF_VALUE: 17
PIF_VALUE: 15
PIF_VALUE: 16
PIF_VALUE: 17
PIF_VALUE: 17
PIF_VALUE: 16
PIF_VALUE: 18
PIF_VALUE: 3
PIF_VALUE: 8
PIF_VALUE: 17
PIF_VALUE: 16
PIF_VALUE: 17
PIF_VALUE: 16
PIF_VALUE: 11
PIF_VALUE: 17
PIF_VALUE: 16
PIF_VALUE: 16
PIF_VALUE: 17
PIF_VALUE: 17
PIF_VALUE: 14
PIF_VALUE: 17
PIF_VALUE: 14
PIF_VALUE: 0
PIF_VALUE: 14
PIF_VALUE: 18
PIF_VALUE: 16
PIF_VALUE: 17
PIF_VALUE: 16
PIF_VALUE: 17
PIF_VALUE: 17
PIF_VALUE: 14
PIF_VALUE: 4
PIF_VALUE: 17
PIF_VALUE: 17
PIF_VALUE: 1
PIF_VALUE: 16
PIF_VALUE: 16
PIF_VALUE: 17
PIF_VALUE: 17
PIF_VALUE: 16
PIF_VALUE: 16
PIF_VALUE: 4
PIF_VALUE: 17
PIF_VALUE: 18
PIF_VALUE: 16
PIF_VALUE: 17
PIF_VALUE: 16
PIF_VALUE: 16
PIF_VALUE: 12
PIF_VALUE: 17
PIF_VALUE: 18
PIF_VALUE: 16
PIF_VALUE: 17

## 2020-04-03 ASSESSMENT — PAIN SCALES - GENERAL
PAINLEVEL_OUTOF10: 6
PAINLEVEL_OUTOF10: 7
PAINLEVEL_OUTOF10: 5
PAINLEVEL_OUTOF10: 5
PAINLEVEL_OUTOF10: 0
PAINLEVEL_OUTOF10: 5

## 2020-04-03 ASSESSMENT — PAIN DESCRIPTION - DESCRIPTORS
DESCRIPTORS: BURNING
DESCRIPTORS: ACHING;BURNING;SORE

## 2020-04-03 ASSESSMENT — PAIN DESCRIPTION - ORIENTATION
ORIENTATION: RIGHT;LEFT

## 2020-04-03 ASSESSMENT — PAIN DESCRIPTION - LOCATION
LOCATION: BREAST

## 2020-04-03 ASSESSMENT — PAIN - FUNCTIONAL ASSESSMENT
PAIN_FUNCTIONAL_ASSESSMENT: 0-10
PAIN_FUNCTIONAL_ASSESSMENT: PREVENTS OR INTERFERES SOME ACTIVE ACTIVITIES AND ADLS

## 2020-04-03 ASSESSMENT — PAIN DESCRIPTION - PAIN TYPE
TYPE: SURGICAL PAIN

## 2020-04-03 ASSESSMENT — PAIN DESCRIPTION - PROGRESSION: CLINICAL_PROGRESSION: NOT CHANGED

## 2020-04-03 ASSESSMENT — PAIN DESCRIPTION - ONSET: ONSET: ON-GOING

## 2020-04-03 ASSESSMENT — PAIN DESCRIPTION - FREQUENCY: FREQUENCY: CONTINUOUS

## 2020-04-03 NOTE — OP NOTE
Operative Note    Vipul Rai  [id number]   Date of service April 3, 2020. Preoperative diagnosis: #1 bilateral breast cancers clinically stage 0. 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 Bilateral sentinel node biopsies. Anesthesia: General    Surgeon: Dr. Josefina Denver    Indications: This patient went in for a screening mammogram which showed diffuse microcalcifications over the lower right breast.  These were biopsied stereotactically revealing ductal carcinoma in situ. The patient then underwent a bilateral breast MRI which showed a questionable area in the left breast which was biopsied stereotactically and revealed ductal carcinoma in situ is well. The patient also underwent genetic testing and was found to have a deleterious BRCA2 mutation and a deleterious Chek 2 mutation. After discussing options for treatment, the patient wished to proceed with bilateral skin sparing mastectomies, bilateral sentinel node biopsies and immediate for stage reconstruction with expanders and acellular dermal matrix. The patient is not a candidate for nipple sparing procedures due to the proximity of the tumor to the nipple. Procedure: The patient was previously injected with technetium sulfur colloid in preparation for bilateral sentinel node biopsies. She was then carefully marked by Dr. Kirt Ravi in the preoperative area for bilateral skin sparing mastectomies. The patient was brought to the surgical suite and placed on the operating room table. While the patient was intubated and given a general anesthetic, the surgical team exited the room except for the anesthesiologist and nurse anesthetist.  30 minutes of air circulating time was allowed before we reentered the operating room. The patient was carefully positioned so that all of her joints were padded.   A warming blanket was placed over the lower torso. Ancef 2 g was given intravenously prior to the incisions. .  Pneumatic cuffs were placed on both legs and continued throughout the case. I did use intraoperative ultrasound to localize the clip in both breasts. This was carefully marked on the skin in case these areas came close to the anterior margin. Both breasts, both axillas, arms and neck and abdomen were prepped with Hibiclens soap followed by ChloraPrep. After an appropriate drying time I started on the right side by using the peak plasma blade to make an incision based on the previous markings by Dr. Donald Nelson. This removed the nipple areolar complex but preserved the rest of the skin. Skin flaps were carefully created to the level of the clavicle, the anterior rectus sheath, the sternum and the lateral skin edge by removing the subcutaneous tissues and breast from the overlying skin. When the breast remained on the lateral skin edge I excised this with the harmonic focus to cut down on blood loss. This breast was carefully oriented for pathology including the sites of the clip and the palpable thickening. It was sent to pathology for frozen section for margins. Any small vessels off the internal mammary artery were clipped or coagulated. While awaiting pathology I did go back and make sure that all of the flaps were of an appropriately thin thickness but preserving the blood supply. The pathologist called back stating that the area of biopsy was identified and was 2.5 cm away from the skin and well away from the posterior margin. Because of the imaging which suggested calcifications very close to the skin I did joanna an additional area of skin inferior to the nipple areolar complex which would be removed by Dr. Donald Nelson. I read directed my attention to the right axilla where by palpation there were no abnormal lymph nodes. I brought the neoprobe into the wound and identified the first hotspot in the mid central portion.   A single lymph node was identified with counts of 300. There was a second small lymph node adjacent to this with counts of 100. A second hotspot was identified slightly superior and medial.  This third lymph node was removed with counts of 250. All 3 of these lymph nodes were sent to pathology for frozen section. The pathologist called back stating that all 3 lymph nodes were negative for malignancy. Hemostasis was obtained. I went back and reexamined the entire chest wall and the flaps to make sure that all the breast tissue was removed. At this time I changed my gloves and moved to the left side. A symmetrically placed incision was made such that the nipple areolar complex was removed. Flaps were created by removing the subcutaneous tissues and breast from the overlying skin to the level of the clavicle, the sternum, the anterior rectus sheath and the lateral skin edge. Small vessels off the internal mammary artery were clipped or coagulated. When the breast remained on the lateral skin edge it was excised with the harmonic focus. This breast was carefully oriented for pathology including the site of the cancer. It was then sent to pathology for frozen section for margins. The pathologist called back stating that the biopsy was identified 1 cm from the anterior margin. An additional focus of skin superior to the nipple areolar complex was marked for Dr. Varun Thomas to remove as additional tissue anterior margin left side. I redirected my attention to the left axilla where by palpation there were no abnormal lymph nodes. The neoprobe was brought into the wound and identified a single hotspot in the mid central portion. 2 lymph nodes were identified and were dissected from the tissue. The first lymph node had counts of 175 and a second lymph node had counts of 125. I rescanned the entire axilla and did not find any elevated counts over 0. The 2 left sentinel nodes were sent to pathology for frozen section.   The pathologist called back stating that both lymph nodes were negative for malignancy. I went back and reexamined the entire left chest wall and axilla to make sure that all of the breast tissue was removed. Meticulous hemostasis was obtained. The both cavities were irrigated with sterile water until the effluent returned clear. At this point the wounds were packed. Dr. Ashley Rico entered the room in preparation for the first stage reconstruction. The sponge and needle counts are correct at this point in the procedure. The estimated blood loss is 125 cc.

## 2020-04-03 NOTE — ANESTHESIA POSTPROCEDURE EVALUATION
Department of Anesthesiology  Postprocedure Note    Patient: Bert Spence  MRN: [id number]  YOB: 1980  Date of evaluation: 4/3/2020  Time:  5:40 PM     Procedure Summary     Date:  04/03/20 Room / Location:  [address] [place] [address] / [facility] - INPATIENT    Anesthesia Start:  1021 Anesthesia Stop:  1656    Procedures:       BILATERAL SKIN SPARING BREAST MASTECTOMY WITH BILATERAL SENTINEL NODE BIOPSY (Bilateral )      BILATERAL IMMEDIATE RECONSTRUCTION BREAST IMPLANT TISSUE EXPANDER INSERTION WITH HUMAN ACELLULAR DERMAL MATRIX (Bilateral ) Diagnosis:  (DX HANNAH BR CA)    Surgeon:  Yasemin Childress MD; Fish Lazaro MD Responsible Provider:  Emma Hilliard MD    Anesthesia Type:  general ASA Status:  2          Anesthesia Type: general    Yolanda Phase I: Yolanda Score: 8    Yolanda Phase II:      Last vitals: Reviewed and per EMR flowsheets.        Anesthesia Post Evaluation    Patient location during evaluation: PACU  Patient participation: complete - patient participated  Level of consciousness: awake  Airway patency: patent  Nausea & Vomiting: no nausea  Complications: no  Cardiovascular status: blood pressure returned to baseline  Respiratory status: acceptable  Hydration status: euvolemic
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
## 2020-04-03 NOTE — OP NOTE
Operative Note      Patient: David Cerda  YOB: 1980  MRN: [id number]    Date of Procedure: 4/3/2020    Pre-Op Diagnosis: DX HANNAH BR CA    Post-Op Diagnosis: Same and acquired absence bilateral breasts. bilateral macromastia and breast ptosis. breast asymetry       Procedure(s):    BILATERAL IMMEDIATE RECONSTRUCTION BREAST IMPLANT TISSUE EXPANDER INSERTION WITH HUMAN ACELLULAR DERMAL MATRIX [id number] (bilateral)  Bilateral adipofascial flap lower pole coverage measuring 23x7cm    Surgeon(s):  Kendrick Millan MD    Assistant:   Surgical Assistant: Ochoa Tate RN    Anesthesia: General    Estimated Blood Loss (mL): less than 50     Complications: None    Specimens:   ID Type Source Tests Collected by Time Destination   A : RIGHT BREAST. LONG BLACK SUTURE IS SUPERIOR, SHORT BLACK SUTURE IS LATERAL, WHITE SUTURE IS THE SITE OF THE CANCER. FROZEN SECTION FOR MARGINS Tissue Breast SURGICAL PATHOLOGY Ignacia Freeman MD 4/3/2020 1144    B : RIGHT BREAST 3 SENTINEL NODES FOR FROZEN SECTION Tissue Breast SURGICAL PATHOLOGY Ignacia Freeman MD 4/3/2020 1145    C : LEFT BREAST LONG BLACK SUTURE SUPERIOR, SHORT BLACK SUTURE LATERAL, WHITE SUTURE MARKS SITE OF CANCER, FROZEN SECTION MARGINS  Tissue Breast SURGICAL PATHOLOGY Ignacia Freeman MD 4/3/2020 1333    D : LEFT BREAST 2 SENTINEL NODES FOR FROZEN SECTION  Tissue Breast SURGICAL PATHOLOGY Ignacia Freeman MD 4/3/2020 1336    E : RIGHT BREAST ADDITIONAL AXILLARY TISSUE  Tissue Breast SURGICAL PATHOLOGY Ignacia Freeman MD 4/3/2020 1347    F : LEFT BREAST ADDITIONAL AXILLARY TISSUE  Tissue Breast SURGICAL PATHOLOGY Ignacia Freeman MD 4/3/2020 1354    G : RIGHT BREAST ADDITIONAL ANTERIOR MARGIN  Tissue Breast SURGICAL PATHOLOGY Ignacia Freeman MD 4/3/2020 1427    H : LEFT BREAST ADDITIONAL ANTERIOR MARGIN  Tissue Breast SURGICAL PATHOLOGY Ignacia Freeman MD 4/3/2020 1432        Implants:  Implant Name Type Inv.  Item Serial No.  Lot No. LRB No. Used Action   ADELA-ALLOGRAFT DERMIS 1MM [id number] Caralyn Prader [id number] Bone/Graft/Tissue/Human/Synth ADELA-ALLOGRAFT DERMIS 1MM [id number] [employer] [id number] [employer]  Right 1 Implanted   IMPL EXPANDER TISS BREAST 600-720CC  Mammary:Expander/Prosthesis IMPL EXPANDER TISS BREAST 600-720CC   [employer] [id number] Right 1 Implanted   ADELA-ALLOGRAFT DERMIS 1MM [id number] [employer] - [id number] Bone/Graft/Tissue/Human/Synth ADELA-ALLOGRAFT DERMIS 1MM [id number] [employer] [id number] [employer]  Left 1 Implanted   IMPL EXPANDER TISS BREAST 600-720CC FH Mammary:Expander/Prosthesis IMPL EXPANDER TISS BREAST 600-720CC   [employer] [id number] Left 1 Implanted         Drains:   Closed/Suction Drain Right Breast Bulb 15 [place] (Active)       Closed/Suction Drain Right Breast Bulb 15 [place] (Active)       Closed/Suction Drain Left Breast Bulb 15 [place] (Active)       Closed/Suction Drain Left Breast Bulb 15 [place] (Active)       Urethral Catheter Double-lumen;Non-latex 16 fr (Active)       Findings: as expected    Indications:  Patient is a 38 year old female who presents with bilateral breast cancer and genetic predisposition to breast cancer (BRCA2 and CHEK2). She additionally had excess skin and soft tissue (macromastia) of the chest wall and breast with closure skin margins in the lower pole necessitating a reduction pattern mastectomy with inferior autoderm sling. Today she is being taken to the operating room by [facility] with bilateral SLNB and I will perform immediate staged breast reconstruction with Lina with a skin reductive pattern. We discussed indications, alternatives, risks, benefits and expected scar patterns associated with the procedure. The patient signed informed consent and was eager to proceed.      Technique     The patient was met in the preop area and marked in the standing position for the discussed procedure. Due to her macromastia and ptosis as well as lower pole skin removal need.  she was marked for a wise pattern skin reduction with an inferior adipofascial flap for vascular and structural support.  She understood and was agreeable to proceed.      The patient was taken to the operating room and laid supine on the operating room table. She received IV abx, bilateral PAS hose, a forced air lower body warming blanket, and a lin catheter. Dr Salas prepped and draped the patient in a sterile manner. A formal timeout was performed in the room and all were in agreement. Dr Salas performed the extirpative portion of the operation and I was called once this was complete. My portion of the operation began by on the right side and was repeated on the left in a separate and distinct fashion. I first began by inspecting the perfusion of the skin flap with making sure the skin edges showed punctate bleeding and there were no dermal violations. Once this was done she was deemed appropriate for pre-pectoral reconstruction. A 16x20 cm piece of hADM was opened and prepared per the  reccomendations. Next the chest wall was measured to choose the TE size. Once selected it was opened and air suctioned out of the expander. Next on the back table a pre-pectoral construct was created using the hADM and TE in the form of a 3cm posterior lower pole sling . The ADM was then transferred to the chest wall and the poseterior fold as well as the IMF was sutured to the lower pole with 2-0 vicryl suture. Next the tissue expander was placed into the ADM construct and the tabs sutured in place medially, laterally and superiorly with 2-0 PDS suture. The anterior surface of the expander was then covered completely with the hADM and the ADM was sutured to the chest wall circumferentially with 2-0 vicryl. Local anesthesia was then injected in the form of an intercostal block. 15F KYRIE drains x2 were then placed into the chest pocket and exteriorized at the IMF and anterior axillary line.  They were secured in place with 2-0 prolene. The pocket was irrigated with tripple antibiotic and half strength betadine. Hemostasis was revised.  The inferior excess lower pole adipocutanoues flap was then designed as measured above and de-epithelilized in order to be transferred over the hADM to provide support and vascularity to the construct. This was secured to the adm with 2-0 vicryl and drain placed beneath the flap in order to encourage drainage. The upper mastectomy flaps were then transferred over the implant and flap construct and temporarily closed with staples. Closure then commenced with interrupted 3-0 moncryl suitures in the vertical and horizontal limbs and a running 4-0 monocryl subcuticular suture. The skin was cleaned and dried and dermabond was applied. Attention was then directed to the contralateral right breast where the same procedure was repeated in a separate and distinct fashion. The skin was assessed, implant and ADM construct made and secured to chest wall, and same size adipocutaneous flap was deepithelialized, transferred over the implant and ADM construct and secured to the chest wall with 2-0 vicryl suture. Drains were placed. ADM was fenestrated. Closure commenced in layers with 3-0 monocryl in the deep dermis and 4-0 monocryl running subcuticular. The skin was cleaned and dried and dermabond placed on the incision. 250cc of saline was placed in each expander.  ABD and Bra was fit to the patient.  The patient was awoken from general anesthesia and transferred to recovery in good condition. At the conclusion of the case, the sponge, needle, and instrument counts were reported to be correct by the nursing staff.  I was present and perfromed the entire operation.     Electronically signed by Shelia Fabry, MD on 4/3/2020 at 4:42 PM

## 2020-04-04 VITALS
SYSTOLIC BLOOD PRESSURE: 90 MMHG | BODY MASS INDEX: 30.99 KG/M2 | HEART RATE: 67 BPM | WEIGHT: 186 LBS | DIASTOLIC BLOOD PRESSURE: 48 MMHG | HEIGHT: 65 IN | RESPIRATION RATE: 16 BRPM | TEMPERATURE: 97.9 F | OXYGEN SATURATION: 100 %

## 2020-04-04 LAB
ANION GAP SERPL CALCULATED.3IONS-SCNC: 10 MMOL/L (ref 9–17)
BUN BLDV-MCNC: 8 MG/DL (ref 6–20)
BUN/CREAT BLD: 13 (ref 9–20)
CALCIUM SERPL-MCNC: 8.4 MG/DL (ref 8.6–10.4)
CHLORIDE BLD-SCNC: 108 MMOL/L (ref 98–107)
CO2: 23 MMOL/L (ref 20–31)
CREAT SERPL-MCNC: 0.63 MG/DL (ref 0.5–0.9)
GFR AFRICAN AMERICAN: >60 ML/MIN
GFR NON-AFRICAN AMERICAN: >60 ML/MIN
GFR SERPL CREATININE-BSD FRML MDRD: ABNORMAL ML/MIN/{1.73_M2}
GFR SERPL CREATININE-BSD FRML MDRD: ABNORMAL ML/MIN/{1.73_M2}
GLUCOSE BLD-MCNC: 124 MG/DL (ref 70–99)
HCT VFR BLD CALC: 36.2 % (ref 36.3–47.1)
HEMOGLOBIN: 11.6 G/DL (ref 11.9–15.1)
MCH RBC QN AUTO: 29.2 PG (ref 25.2–33.5)
MCHC RBC AUTO-ENTMCNC: 32 G/DL (ref 28.4–34.8)
MCV RBC AUTO: 91.2 FL (ref 82.6–102.9)
NRBC AUTOMATED: 0 PER 100 WBC
PDW BLD-RTO: 13.4 % (ref 11.8–14.4)
PLATELET # BLD: 231 K/UL (ref 138–453)
PMV BLD AUTO: 9.4 FL (ref 8.1–13.5)
POTASSIUM SERPL-SCNC: 4 MMOL/L (ref 3.7–5.3)
RBC # BLD: 3.97 M/UL (ref 3.95–5.11)
SODIUM BLD-SCNC: 141 MMOL/L (ref 135–144)
WBC # BLD: 12.9 K/UL (ref 3.5–11.3)

## 2020-04-04 PROCEDURE — 6370000000 HC RX 637 (ALT 250 FOR IP): Performed by: SURGERY

## 2020-04-04 PROCEDURE — 36415 COLL VENOUS BLD VENIPUNCTURE: CPT

## 2020-04-04 PROCEDURE — 2580000003 HC RX 258: Performed by: SURGERY

## 2020-04-04 PROCEDURE — G0378 HOSPITAL OBSERVATION PER HR: HCPCS

## 2020-04-04 PROCEDURE — 6360000002 HC RX W HCPCS: Performed by: SURGERY

## 2020-04-04 PROCEDURE — 96365 THER/PROPH/DIAG IV INF INIT: CPT

## 2020-04-04 PROCEDURE — 96366 THER/PROPH/DIAG IV INF ADDON: CPT

## 2020-04-04 PROCEDURE — 2500000003 HC RX 250 WO HCPCS: Performed by: SURGERY

## 2020-04-04 PROCEDURE — 96376 TX/PRO/DX INJ SAME DRUG ADON: CPT

## 2020-04-04 PROCEDURE — 85027 COMPLETE CBC AUTOMATED: CPT

## 2020-04-04 PROCEDURE — 96375 TX/PRO/DX INJ NEW DRUG ADDON: CPT

## 2020-04-04 PROCEDURE — 80048 BASIC METABOLIC PNL TOTAL CA: CPT

## 2020-04-04 RX ORDER — IBUPROFEN 800 MG/1
800 TABLET ORAL EVERY 8 HOURS PRN
Qty: 50 TABLET | Refills: 3 | Status: SHIPPED | OUTPATIENT
Start: 2020-04-04 | End: 2020-06-03 | Stop reason: SDUPTHER

## 2020-04-04 RX ADMIN — ACETAMINOPHEN 650 MG: 325 TABLET ORAL at 03:41

## 2020-04-04 RX ADMIN — ACETAMINOPHEN 650 MG: 325 TABLET ORAL at 14:57

## 2020-04-04 RX ADMIN — KETOROLAC TROMETHAMINE 30 MG: 30 INJECTION, SOLUTION INTRAMUSCULAR at 12:26

## 2020-04-04 RX ADMIN — CEFAZOLIN 1 G: 1 INJECTION, POWDER, FOR SOLUTION INTRAMUSCULAR; INTRAVENOUS at 12:26

## 2020-04-04 RX ADMIN — ACETAMINOPHEN 650 MG: 325 TABLET ORAL at 08:02

## 2020-04-04 RX ADMIN — DEXTROSE, SODIUM CHLORIDE, AND POTASSIUM CHLORIDE: 5; .9; .15 INJECTION INTRAVENOUS at 08:02

## 2020-04-04 RX ADMIN — CEFAZOLIN 1 G: 1 INJECTION, POWDER, FOR SOLUTION INTRAMUSCULAR; INTRAVENOUS at 03:41

## 2020-04-04 RX ADMIN — KETOROLAC TROMETHAMINE 30 MG: 30 INJECTION, SOLUTION INTRAMUSCULAR at 01:03

## 2020-04-04 RX ADMIN — KETOROLAC TROMETHAMINE 30 MG: 30 INJECTION, SOLUTION INTRAMUSCULAR at 05:37

## 2020-04-04 RX ADMIN — OXYCODONE HYDROCHLORIDE 10 MG: 5 TABLET ORAL at 15:03

## 2020-04-04 ASSESSMENT — PAIN DESCRIPTION - ONSET: ONSET: ON-GOING

## 2020-04-04 ASSESSMENT — PAIN DESCRIPTION - ORIENTATION
ORIENTATION: RIGHT;LEFT

## 2020-04-04 ASSESSMENT — PAIN - FUNCTIONAL ASSESSMENT: PAIN_FUNCTIONAL_ASSESSMENT: PREVENTS OR INTERFERES SOME ACTIVE ACTIVITIES AND ADLS

## 2020-04-04 ASSESSMENT — PAIN SCALES - GENERAL
PAINLEVEL_OUTOF10: 0
PAINLEVEL_OUTOF10: 5
PAINLEVEL_OUTOF10: 2
PAINLEVEL_OUTOF10: 2
PAINLEVEL_OUTOF10: 5
PAINLEVEL_OUTOF10: 2

## 2020-04-04 ASSESSMENT — PAIN DESCRIPTION - FREQUENCY: FREQUENCY: CONTINUOUS

## 2020-04-04 ASSESSMENT — PAIN DESCRIPTION - LOCATION
LOCATION: BREAST

## 2020-04-04 ASSESSMENT — PAIN DESCRIPTION - PAIN TYPE
TYPE: SURGICAL PAIN

## 2020-04-04 ASSESSMENT — PAIN DESCRIPTION - DESCRIPTORS: DESCRIPTORS: BURNING;ACHING

## 2020-04-04 NOTE — PLAN OF CARE
Problem: Pain:  Goal: Pain level will decrease  Description: Pain level will decrease  Outcome: Ongoing  Goal: Control of acute pain  Description: Control of acute pain  Outcome: Ongoing     Problem: Infection - Surgical Site:  Goal: Will show no infection signs and symptoms  Description: Will show no infection signs and symptoms  Outcome: Ongoing     Problem:  Activity:  Goal: Ability to maintain or regain function will improve  Description: Ability to maintain or regain function will improve  Outcome: Ongoing     Problem: Physical Regulation:  Goal: Postoperative complications will be avoided or minimized  Description: Postoperative complications will be avoided or minimized  Outcome: Ongoing     Problem: Self-Concept:  Goal: Level of anxiety will decrease  Description: Level of anxiety will decrease  Outcome: Ongoing  Goal: Ability to verbalize positive feelings about self will improve  Description: Ability to verbalize positive feelings about self will improve  Outcome: Ongoing     Problem: Sensory:  Goal: Pain level will decrease  Description: Pain level will decrease  Outcome: Ongoing  Goal: Expressions of feelings of enhanced comfort will increase  Description: Expressions of feelings of enhanced comfort will increase  Outcome: Ongoing     Problem: Skin Integrity:  Goal: Demonstration of wound healing without infection will improve  Description: Demonstration of wound healing without infection will improve  Outcome: Ongoing

## 2020-04-04 NOTE — DISCHARGE SUMMARY
Apollo Ma  [id number]  Date of discharge is April 4, 2020    Principal procedure: Bilateral breast cancer. Other diagnoses: Deleterious BRCA2 mutation and deleterious Chek 2 mutation    Principal procedure: Bilateral skin sparing mastectomies, bilateral sentinel node biopsies and immediate bilateral for stage reconstruction on April 3, 2020. Findings at surgery: The sentinel nodes were negative on both sides. The remainder the pathology report is currently pending. Hospital course: The patient recovered very smoothly and required only IV Toradol and acetaminophen for pain management. Disposition: Home    Medications: The patient will be taking oral Motrin, oral Tylenol, Norco, Keflex and Valium if needed. Recommendations: Follow-up with Dr. Maki Marie and Dr. Nkiko Palafox.

## 2020-04-04 NOTE — PLAN OF CARE
Denies need for pain med other than toradol and tylenol  Dressing dry KYRIE x4 draining small amounts Afebrile Lungs clear Denies calf pain or dysuria  Able to walk in hallway  Problem: Pain:  Goal: Pain level will decrease  Description: Pain level will decrease  4/4/2020 0241 by Kip Lange RN  Outcome: Ongoing     Problem: Infection - Surgical Site:  Goal: Will show no infection signs and symptoms  Description: Will show no infection signs and symptoms  4/4/2020 1048 by Raymon Sanders RN  Outcome: Ongoing  4/4/2020 0241 by Kip Lange RN  Outcome: Ongoing     Problem:  Activity:  Goal: Ability to maintain or regain function will improve  Description: Ability to maintain or regain function will improve  4/4/2020 1048 by Raymon Sanders RN  Outcome: Ongoing  4/4/2020 0241 by Kip Lange RN  Outcome: Ongoing Sent

## 2020-04-04 NOTE — PROGRESS NOTES
Jadiel Knows  [id number]  Date of service; April 4, 2020. Subjective: The patient is afebrile. Her vital signs are stable. She is comfortable with IV Toradol and oral Tylenol. The patient is ambulating without difficulty and eating a normal diet. She is urinating without difficulty. Objective: The patient appears comfortable and has good color. There is no calf pain or swelling. Both mastectomy flaps are clean without evidence of ischemia, hematoma or bruising. Labs: The hemoglobin is 11.5. The basic metabolic profile is normal    Impression: The patient is recovering very smoothly from the bilateral skin sparing mastectomies, bilateral sentinel node biopsies and immediate for stage bilateral reconstruction on April 3, 2020. Recommendation: The patient will be discharged today to be followed by Dr. Courtney Ceballos and Dr. Itzel Valentine.

## 2020-04-06 ENCOUNTER — APPOINTMENT (OUTPATIENT)
Dept: NUCLEAR MEDICINE | Age: 40
End: 2020-04-06
Payer: COMMERCIAL

## 2020-04-06 ENCOUNTER — CARE COORDINATION (OUTPATIENT)
Dept: CARE COORDINATION | Age: 40
End: 2020-04-06

## 2020-04-06 NOTE — CARE COORDINATION
COVID-19 ED/Flu Clinic Initial Outreach Note    Patient contacted regarding recent visit for viral symptoms. This Constance Lopez contacted the patient by telephone to perform post discharge call. Verified name and  with patient as identifiers. Provided introduction to self, and reason for call due to viral symptoms of infection and/or exposure to COVID-19. Patient presented to emergency department/flu clinic with complaints of viral symptoms/exposure to COVID. Patient reports symptoms are the same. Due to no new or worsening symptoms the RN CTN/CARLOS was not notified for escalation. Discussed exposure protocols and quarantine with CDC Guidelines What To Do If You Are Sick    Patient was given an opportunity for questions and concerns. Stay home except to get medical care  People who are mildly ill with COVID-19 are able to isolate at home during their illness. You should restrict activities outside your home, except for getting medical care. Do not go to work, school, or public areas. Avoid using public transportation, ride-sharing, or taxis. Separate yourself from other people and animals in your home  People: As much as possible, you should stay in a specific room and away from other people in your home. Also, you should use a separate bathroom, if available. Animals: You should restrict contact with pets and other animals while you are sick with COVID-19, just like you would around other people. Although there have not been reports of pets or other animals becoming sick with COVID-19, it is still recommended that people sick with COVID-19 limit contact with animals until more information is known about the virus. When possible, have another member of your household care for your animals while you are sick. If you are sick with COVID-19, avoid contact with your pet, including petting, snuggling, being kissed or licked, and sharing food.  If you must care for your pet or be around animals while you are

## 2020-04-08 LAB — SURGICAL PATHOLOGY REPORT: NORMAL

## 2020-04-20 ENCOUNTER — CARE COORDINATION (OUTPATIENT)
Dept: CARE COORDINATION | Age: 40
End: 2020-04-20

## 2020-06-03 ENCOUNTER — OFFICE VISIT (OUTPATIENT)
Dept: ONCOLOGY | Age: 40
End: 2020-06-03
Payer: COMMERCIAL

## 2020-06-03 VITALS
RESPIRATION RATE: 18 BRPM | SYSTOLIC BLOOD PRESSURE: 128 MMHG | TEMPERATURE: 99.2 F | HEIGHT: 65 IN | WEIGHT: 192 LBS | BODY MASS INDEX: 31.99 KG/M2 | DIASTOLIC BLOOD PRESSURE: 73 MMHG | HEART RATE: 80 BPM

## 2020-06-03 PROCEDURE — 99215 OFFICE O/P EST HI 40 MIN: CPT | Performed by: INTERNAL MEDICINE

## 2020-06-03 RX ORDER — IBUPROFEN 800 MG/1
800 TABLET ORAL EVERY 8 HOURS PRN
Qty: 50 TABLET | Refills: 3 | Status: SHIPPED | OUTPATIENT
Start: 2020-06-03

## 2020-06-03 RX ORDER — CEPHALEXIN 500 MG/1
500 CAPSULE ORAL 3 TIMES DAILY
Qty: 21 CAPSULE | Refills: 0 | Status: SHIPPED | OUTPATIENT
Start: 2020-06-03

## 2020-06-03 RX ORDER — TRIAMCINOLONE ACETONIDE 0.25 MG/G
CREAM TOPICAL
Qty: 1 TUBE | Refills: 0 | Status: SHIPPED | OUTPATIENT
Start: 2020-06-03
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
## 2020-08-12 ENCOUNTER — TELEPHONE (OUTPATIENT)
Dept: SURGERY | Age: 40
End: 2020-08-12

## 2020-08-12 NOTE — TELEPHONE ENCOUNTER
Dr. Annetta Mcmahon office notified that Bell King who was referred by Dr. Edison Angela was scheduled to see Dr. Agustin Weathers on 8-5-2020. Bell King left a voicemail stating she was cancelling her appointment and was not going to reschedule as she was going to see a different surgeon. Writer did reach out to Bell King and verified [employer].

## (undated) DEVICE — RESERVOIR,SUCTION,100CC,SILICONE: Brand: MEDLINE

## (undated) DEVICE — APPLIER LIG CLP M L11IN TI STR RNG HNDL FOR 20 CLP DISP

## (undated) DEVICE — GOWN,AURORA,NON-REINFORCED,2XL: Brand: MEDLINE

## (undated) DEVICE — COVER LT HNDL BLU PLAS

## (undated) DEVICE — CHLORAPREP 26ML ORANGE

## (undated) DEVICE — SURGICAL PROCEDURE KIT BASIN MAJ SET UP

## (undated) DEVICE — SYRINGE MED 50ML LUERLOCK TIP

## (undated) DEVICE — SKIN PREP TRAY W/CHG: Brand: MEDLINE INDUSTRIES, INC.

## (undated) DEVICE — TUBING, SUCTION, 1/4" X 12', STRAIGHT: Brand: MEDLINE

## (undated) DEVICE — SOLUTION IV 1000ML 0.9% SOD CHL PH 5 INJ USP VIAFLX PLAS

## (undated) DEVICE — BLADE ES L6IN ELASTOMERIC COAT INSUL DURABLE BEND UPTO

## (undated) DEVICE — KIT INFUS PMP 270ML 4ML/HR 2ML/SITE SOAK CATH L5IN N NARC

## (undated) DEVICE — GOWN,SIRUS,NON REINFRCD,LARGE,SET IN SL: Brand: MEDLINE

## (undated) DEVICE — GLOVE SURG SZ 65 CRM LTX FREE POLYISOPRENE POLYMER BEAD ANTI

## (undated) DEVICE — CONTAINER,SPECIMEN,OR STERILE,4OZ: Brand: MEDLINE

## (undated) DEVICE — APPLIER CLP L L13IN TI MULT RNG HNDL 20 CLP STR LIGACLP

## (undated) DEVICE — NEEDLE HYPO 25GA L1.5IN BLU POLYPR HUB S STL REG BVL STR

## (undated) DEVICE — Device: Brand: BULB HOLDER

## (undated) DEVICE — Z DISCONTINUED USE 2558683 BANDAGE COMPR 2XL MAMM COMFORTABLE HIGHLY ABSRB POST SURG

## (undated) DEVICE — DRAIN SURG 15FR SIL RND CHN W/ TRCR FULL FLUT DBL WRP TRAD

## (undated) DEVICE — GLOVE SURG SZ 6 CRM LTX FREE POLYISOPRENE POLYMER BEAD ANTI

## (undated) DEVICE — SOLUTION PREP POVIDONE IOD FOR SKIN MUCOUS MEM PRIOR TO

## (undated) DEVICE — GLOVE SURG SZ 75 CRM LTX FREE POLYISOPRENE POLYMER BEAD ANTI

## (undated) DEVICE — DECANTER BAG 9": Brand: MEDLINE INDUSTRIES, INC.

## (undated) DEVICE — GLOVE SURG SZ 7 CRM LTX FREE POLYISOPRENE POLYMER BEAD ANTI

## (undated) DEVICE — GARMENT,MEDLINE,DVT,INT,CALF,MED, GEN2: Brand: MEDLINE

## (undated) DEVICE — TOWEL,OR,DSP,ST,BLUE,DLX,XR,4/PK,20PK/CS: Brand: MEDLINE

## (undated) DEVICE — INTENDED FOR TISSUE SEPARATION, AND OTHER PROCEDURES THAT REQUIRE A SHARP SURGICAL BLADE TO PUNCTURE OR CUT.: Brand: BARD-PARKER ® CARBON RIB-BACK BLADES

## (undated) DEVICE — YANKAUER,FLEXIBLE HANDLE,REGLR CAPACITY: Brand: MEDLINE INDUSTRIES, INC.

## (undated) DEVICE — 3M™ TEGADERM™ CHG DRESSING 25/CARTON 4 CARTONS/CASE 1657: Brand: TEGADERM™

## (undated) DEVICE — PLASMABLADE PS210-030S 3.0S LOCK: Brand: PLASMABLADE™

## (undated) DEVICE — BLADE ES L6IN ELASTOMERIC COAT EXT DURABLE BEND UPTO 90DEG

## (undated) DEVICE — PAD,ABDOMINAL,5"X9",ST,LF,25/BX: Brand: MEDLINE INDUSTRIES, INC.

## (undated) DEVICE — GLOVE SURG SZ 8 CRM LTX FREE POLYISOPRENE POLYMER BEAD ANTI

## (undated) DEVICE — PREP-RESISTANT MARKER W/ RULER: Brand: MEDLINE INDUSTRIES, INC.

## (undated) DEVICE — LUER-LOK 360°: Brand: CONNECTA, LUER-LOK

## (undated) DEVICE — STOCKINETTE,DOUBLE PLY,6X48,STERILE: Brand: MEDLINE

## (undated) DEVICE — NEEDLE SPINAL 22GA L3.5IN SPINOCAN

## (undated) DEVICE — TOTAL TRAY, DB, 100% SILI FOLEY, 16FR 10: Brand: MEDLINE

## (undated) DEVICE — GLOVE SURG SZ 8 L12IN FNGR THK79MIL GRN LTX FREE

## (undated) DEVICE — Device

## (undated) DEVICE — PROBE COVER KIT: Brand: MEDLINE INDUSTRIES, INC.

## (undated) DEVICE — BLANKET WRM W40.2XL55.9IN IORT LO BODY + MISTRAL AIR

## (undated) DEVICE — 3M™ IOBAN™ 2 ANTIMICROBIAL INCISE DRAPE 6650EZ: Brand: IOBAN™ 2

## (undated) DEVICE — SHEARS ENDOSCP L9CM CRV HARM FOCS +

## (undated) DEVICE — ADHESIVE SKIN CLSR 0.7ML TOP DERMBND ADV

## (undated) DEVICE — DRAPE,REIN 53X77,STERILE: Brand: MEDLINE

## (undated) DEVICE — SOLUTION IV 250ML 0.9% SOD CHL PH 5 INJ USP VIAFLX PLAS

## (undated) DEVICE — SYRINGE IRRIG 60ML SFT PLIABLE BLB EZ TO GRP 1 HND USE W/

## (undated) DEVICE — SPONGE LAP W18XL18IN WHT COT 4 PLY FLD STRUNG RADPQ DISP ST